# Patient Record
Sex: FEMALE | Race: WHITE | NOT HISPANIC OR LATINO | Employment: UNEMPLOYED
[De-identification: names, ages, dates, MRNs, and addresses within clinical notes are randomized per-mention and may not be internally consistent; named-entity substitution may affect disease eponyms.]

---

## 2016-01-20 LAB — PAP SMEAR - HIM PATIENT REPORTED: NEGATIVE

## 2017-06-17 ENCOUNTER — HEALTH MAINTENANCE LETTER (OUTPATIENT)
Age: 42
End: 2017-06-17

## 2018-02-28 ENCOUNTER — OFFICE VISIT (OUTPATIENT)
Dept: FAMILY MEDICINE | Facility: CLINIC | Age: 43
End: 2018-02-28
Payer: COMMERCIAL

## 2018-02-28 VITALS
HEART RATE: 72 BPM | HEIGHT: 72 IN | TEMPERATURE: 99.2 F | WEIGHT: 170 LBS | DIASTOLIC BLOOD PRESSURE: 67 MMHG | OXYGEN SATURATION: 99 % | SYSTOLIC BLOOD PRESSURE: 107 MMHG | BODY MASS INDEX: 23.03 KG/M2

## 2018-02-28 DIAGNOSIS — Z01.818 PREOP GENERAL PHYSICAL EXAM: Primary | ICD-10-CM

## 2018-02-28 DIAGNOSIS — S86.012A RUPTURE OF ACHILLES TENDON, LEFT, INITIAL ENCOUNTER: ICD-10-CM

## 2018-02-28 LAB — HGB BLD-MCNC: 12.3 G/DL (ref 11.7–15.7)

## 2018-02-28 PROCEDURE — 99202 OFFICE O/P NEW SF 15 MIN: CPT | Performed by: NURSE PRACTITIONER

## 2018-02-28 PROCEDURE — 36415 COLL VENOUS BLD VENIPUNCTURE: CPT | Performed by: NURSE PRACTITIONER

## 2018-02-28 PROCEDURE — 85018 HEMOGLOBIN: CPT | Performed by: NURSE PRACTITIONER

## 2018-02-28 RX ORDER — DULOXETIN HYDROCHLORIDE 60 MG/1
60 CAPSULE, DELAYED RELEASE ORAL
Status: ON HOLD | COMMUNITY
Start: 2016-01-20 | End: 2019-06-17

## 2018-02-28 NOTE — NURSING NOTE
No chief complaint on file.      Initial /67 (BP Location: Right arm, Cuff Size: Adult Large)  Pulse 72  Temp 99.2  F (37.3  C) (Tympanic)  Ht 6' (1.829 m)  Wt 170 lb (77.1 kg)  LMP 05/15/2012  SpO2 99%  Breastfeeding? No  BMI 23.06 kg/m2 Estimated body mass index is 23.06 kg/(m^2) as calculated from the following:    Height as of this encounter: 6' (1.829 m).    Weight as of this encounter: 170 lb (77.1 kg).  Medication Reconciliation: complete   Jazz Ojeda MA

## 2018-02-28 NOTE — MR AVS SNAPSHOT
After Visit Summary   2/28/2018    Daniella Harris    MRN: [id number]           Patient Information     Date Of Birth          1975        Visit Information        Provider Department      2/28/2018 4:00 PM Christiano Guthrie APRN CNP [facility]        Today's Diagnoses     Preop general physical exam    -  1    Rupture of Achilles tendon, left, initial encounter          Care Instructions      Before Your Surgery      Call your surgeon if there is any change in your health. This includes signs of a cold or flu (such as a sore throat, runny nose, cough, rash or fever).    Do not smoke, drink alcohol or take over the counter medicine (unless your surgeon or primary care doctor tells you to) for the 24 hours before and after surgery.    If you take prescribed drugs: Follow your doctor s orders about which medicines to take and which to stop until after surgery.    Eating and drinking prior to surgery: follow the instructions from your surgeon    Take a shower or bath the night before surgery. Use the soap your surgeon gave you to gently clean your skin. If you do not have soap from your surgeon, use your regular soap. Do not shave or scrub the surgery site.  Wear clean pajamas and have clean sheets on your bed.           Follow-ups after your visit        Who to contact     If you have questions or need follow up information about today's clinic visit or your schedule please contact [facility] directly at [phone].  Normal or non-critical lab and imaging results will be communicated to you by MyChart, letter or phone within 4 business days after the clinic has received the results. If you do not hear from us within 7 days, please contact the clinic through MyChart or phone. If you have a critical or abnormal lab result, we will notify you by phone as soon as possible.  Submit refill requests through [employer] or call your pharmacy and they will forward the refill  "request to us. Please allow 3 business days for your refill to be completed.          Additional Information About Your Visit        MyChart Information     [employer] lets you send messages to your doctor, view your test results, renew your prescriptions, schedule appointments and more. To sign up, go to [email or web address] . Click on \"Log in\" on the left side of the screen, which will take you to the Welcome page. Then click on \"Sign up Now\" on the right side of the page.     You will be asked to enter the access code listed below, as well as some personal information. Please follow the directions to create your username and password.     Your access code is: [email or web address]  Expires: 2018 10:41 AM     Your access code will  in 90 days. If you need help or a new code, please call your [facility] clinic or [phone].        Care EveryWhere ID     This is your Care EveryWhere ID. This could be used by other organizations to access your [facility] medical records  [id number]        Your Vitals Were     Pulse Temperature Height Last Period Pulse Oximetry Breastfeeding?    72 99.2  F (37.3  C) (Tympanic) 6' (1.829 m) 05/15/2012 99% No    BMI (Body Mass Index)                   23.06 kg/m2            Blood Pressure from Last 3 Encounters:   18 107/67   10/03/13 104/59   12 101/64    Weight from Last 3 Encounters:   18 170 lb (77.1 kg)   10/03/13 156 lb (70.8 kg)   12 163 lb 3.2 oz (74 kg)              We Performed the Following     Hemoglobin        Primary Care Provider Office Phone # Fax #    Janusz Aranda [phone] [address]   [facility] [address]        Equal Access to Services     BENJA BLAIR : Rangel Pittman, waaxda luqadaha, qaybta kaalmada esme, dakotah granger. [facility] [phone].    ATENCIÓN: Si habla español, tiene a hoang disposición servicios gratuitos de asistencia lingüística. Llame al " [phone].    We comply with applicable federal civil rights laws and [place] laws. We do not discriminate on the basis of race, color, national origin, age, disability, sex, sexual orientation, or gender identity.            Thank you!     Thank you for choosing [facility]  for your care. Our goal is always to provide you with excellent care. Hearing back from our patients is one way we can continue to improve our services. Please take a few minutes to complete the written survey that you may receive in the mail after your visit with us. Thank you!             Your Updated Medication List - Protect others around you: Learn how to safely use, store and throw away your medicines at www.disposemymeds.org.          This list is accurate as of 2/28/18 11:59 PM.  Always use your most recent med list.                   Brand Name Dispense Instructions for use Diagnosis    DULoxetine 60 MG EC capsule    CYMBALTA     Take 60 mg by mouth    Preop general physical exam       MULTIPLE VITAMINS PO      Take  by mouth.        traZODone 50 MG tablet    DESYREL     Take 50 mg by mouth At Bedtime.        TURMERIC PO      Turmeric Supreme    Preop general physical exam       TYSABRI 300 MG/15ML injection   Generic drug:  natalizumab      Inject  into the vein. Once monthly        VITAMIN D PO      Take  by mouth.

## 2018-02-28 NOTE — LETTER
[place] [address] [facility]  [address]  [place]  [address]  Tel: [phone]    March 1, 2018    Daniella Harris  [address] [facility] [address]        Dear Ms. Harris,    Beck Brewer  Your preop hemoglobin looks great! Best of luck on your surgery!  SUSSY Chávez CNP    If you have any further questions or problems, please contact our office.      Sincerely,    Christiano Guthrie NP/ Marychuy Huffman CMA  Results for orders placed or performed in visit on 02/28/18   Hemoglobin   Result Value Ref Range    Hemoglobin 12.3 11.7 - 15.7 g/dL               Enclosure: Lab Results

## 2018-02-28 NOTE — PROGRESS NOTES
[address] [address]  [phone]  Dept: [phone]    PRE-OP EVALUATION:  Today's date: 2018    Daniella Harris (: 1975) presents for pre-operative evaluation assessment as requested by Иван Lao MD  .  She requires evaluation and anesthesia risk assessment prior to undergoing surgery/procedure for treatment of LEFT achilles rupture.    Proposed Surgery/ Procedure: LEFT open achilles tendon repair  Date of Surgery/ Procedure: 3/6/2018  Time of Surgery/ Procedure: 1:30  Hospital/Surgical Facility: [facility]  Fax number for surgical facility: **  Primary Physician: Janusz Aranda  Type of Anesthesia Anticipated: General    Patient has a Health Care Directive or Living Will:  YES at home - will bring to surgery center and to clinic for chart.    1. NO - Do you have a history of heart attack, stroke, stent, bypass or surgery on an artery in the head, neck, heart or legs?  2. NO - Do you ever have any pain or discomfort in your chest?  3. NO - Do you have a history of  Heart Failure?  4. NO - Are you troubled by shortness of breath when: walking on the level, up a slight hill or at night?  5. NO - Do you currently have a cold, bronchitis or other respiratory infection?  6. NO - Do you have a cough, shortness of breath or wheezing?  7. NO - Do you sometimes get pains in the calves of your legs when you walk?  8. NO - Do you or anyone in your family have previous history of blood clots?  9. NO - Do you or does anyone in your family have a serious bleeding problem such as prolonged bleeding following surgeries or cuts?  10. NO - Have you ever had problems with anemia or been told to take iron pills?  11. NO - Have you had any abnormal blood loss such as black, tarry or bloody stools, or abnormal vaginal bleeding?  12. NO - Have you ever had a blood transfusion?  13. NO - Have you or any of your relatives ever had problems with  anesthesia?  14. NO - Do you have sleep apnea, excessive snoring or daytime drowsiness?  15. NO - Do you have any prosthetic heart valves?  16. NO - Do you have prosthetic joints?  17. NO - Is there any chance that you may be pregnant?      HPI:     HPI related to upcoming procedure: achilles rupture on 2/23       See problem list for active medical problems.  Problems all longstanding and stable, except as noted/documented.  See ROS for pertinent symptoms related to these conditions.                                                                                                  .    MEDICAL HISTORY:     Patient Active Problem List    Diagnosis Date Noted     Advanced directives, counseling/discussion 05/31/2012     Priority: Medium     Depression, major, in remission (H)      Priority: Medium     CARDIOVASCULAR SCREENING; LDL GOAL LESS THAN 160 04/16/2012     Priority: Medium     Multiple scleroses      Priority: Medium     Problem list name updated by automated process. Provider to review and confirm        Past Medical History:   Diagnosis Date     Depression, major, in remission (H)      Multiple scleroses 2005    Dr. Mejia     Past Surgical History:   Procedure Laterality Date     wisdom teeth removed  18     Current Outpatient Prescriptions   Medication Sig Dispense Refill     DULoxetine (CYMBALTA) 30 MG capsule Take 30 mg by mouth daily       natalizumab (TYSABRI) 300 MG/15ML injection Inject  into the vein. Once monthly       traZODone (DESYREL) 50 MG tablet Take 50 mg by mouth At Bedtime.       MULTIPLE VITAMINS PO Take  by mouth.       FISH OIL        Cholecalciferol (VITAMIN D PO) Take  by mouth.       OTC products: None, except as noted above    No Known Allergies   Latex Allergy: NO    Social History   Substance Use Topics     Smoking status: Never Smoker     Smokeless tobacco: Never Used     Alcohol use Yes      Comment: min     History   Drug Use No       REVIEW OF SYSTEMS:   Constitutional,  HEENT, cardiovascular, pulmonary, gi and gu systems are negative, except as otherwise noted.    EXAM:   /67 (BP Location: Right arm, Cuff Size: Adult Large)  Pulse 72  Temp 99.2  F (37.3  C) (Tympanic)  Ht 6' (1.829 m)  Wt 170 lb (77.1 kg)  LMP 05/15/2012  SpO2 99%  Breastfeeding? No  BMI 23.06 kg/m2    GENERAL APPEARANCE: healthy, alert and no distress     EYES: EOMI, PERRL     HENT: ear canals and TM's normal and nose and mouth without ulcers or lesions     NECK: no adenopathy, no asymmetry, masses, or scars and thyroid normal to palpation     RESP: lungs clear to auscultation - no rales, rhonchi or wheezes     CV: regular rates and rhythm, normal S1 S2, no S3 or S4 and no murmur, click or rub     MS: extremities normal- no gross deformities noted, no evidence of inflammation in joints, FROM in all extremities.     SKIN: no suspicious lesions or rashes     NEURO: Normal strength and tone, sensory exam grossly normal, mentation intact and speech normal     PSYCH: mentation appears normal. and affect normal/bright     LYMPHATICS: No cervical adenopathy    DIAGNOSTICS:       Labs Resulted Today:   Results for orders placed or performed in visit on 02/28/18   Hemoglobin   Result Value Ref Range    Hemoglobin 12.3 11.7 - 15.7 g/dL       Recent Labs   Lab Test  05/31/12   0928   HGB  10.9*   PLT  348        IMPRESSION:   Reason for surgery/procedure: ruptured achilles tendon  Diagnosis/reason for consult: medical preop    The proposed surgical procedure is considered INTERMEDIATE risk.    REVISED CARDIAC RISK INDEX  The patient has the following serious cardiovascular risks for perioperative complications such as (MI, PE, VFib and 3  AV Block):  No serious cardiac risks  INTERPRETATION: 0 risks: Class I (very low risk - 0.4% complication rate)    The patient has the following additional risks for perioperative complications:  No identified additional risks      ICD-10-CM    1. Preop general physical exam  Z01.818 DULoxetine (CYMBALTA) 60 MG EC capsule     TURMERIC PO     Hemoglobin   2. Rupture of Achilles tendon, left, initial encounter S86.012A        RECOMMENDATIONS:       --Patient is to take all scheduled medications on the day of surgery EXCEPT for modifications listed below.    APPROVAL GIVEN to proceed with proposed procedure, without further diagnostic evaluation       Signed Electronically by: SUSSY Naranjo CNP    Copy of this evaluation report is provided to requesting physician.    [facility] Preop Guidelines

## 2018-03-05 ENCOUNTER — TELEPHONE (OUTPATIENT)
Dept: FAMILY MEDICINE | Facility: CLINIC | Age: 43
End: 2018-03-05

## 2018-03-05 NOTE — TELEPHONE ENCOUNTER
Reason for Call:  Form, our goal is to have forms completed with 72 hours, however, some forms may require a visit or additional information.    Type of letter, form or note:  PRE OP form    Who is the form from?: Patient    Where did the form come from: Patient or family brought in       What clinic location was the form placed at?: [facility]    Where the form was placed: Given to MA/RN    What number is listed as a contact on the form?:   [facility]:  [phone]    Please fax to:  [phone]       Additional comments: please fax pre op form asap    Call taken on 3/5/2018 at 1:34 PM by Lucinda Oliver    .

## 2018-03-05 NOTE — TELEPHONE ENCOUNTER
I faxed Pre op notes and lab to [facility] [place]. Marychuy Huffman, CMA

## 2018-03-26 ENCOUNTER — TELEPHONE (OUTPATIENT)
Dept: FAMILY MEDICINE | Facility: CLINIC | Age: 43
End: 2018-03-26

## 2018-03-26 NOTE — TELEPHONE ENCOUNTER
3/26/2018    Call Regarding [facility] Mammogram    Attempt 1    Message on voicemail     Comments:     Other screenings that are due:  Pap    Outreach   SV

## 2018-06-19 ENCOUNTER — OFFICE VISIT (OUTPATIENT)
Dept: FAMILY MEDICINE | Facility: CLINIC | Age: 43
End: 2018-06-19
Payer: COMMERCIAL

## 2018-06-19 VITALS
SYSTOLIC BLOOD PRESSURE: 106 MMHG | HEART RATE: 66 BPM | WEIGHT: 183 LBS | OXYGEN SATURATION: 100 % | RESPIRATION RATE: 14 BRPM | HEIGHT: 72 IN | BODY MASS INDEX: 24.79 KG/M2 | TEMPERATURE: 98 F | DIASTOLIC BLOOD PRESSURE: 68 MMHG

## 2018-06-19 DIAGNOSIS — Z71.84 TRAVEL ADVICE ENCOUNTER: Primary | ICD-10-CM

## 2018-06-19 PROCEDURE — 99402 PREV MED CNSL INDIV APPRX 30: CPT | Mod: 25 | Performed by: FAMILY MEDICINE

## 2018-06-19 PROCEDURE — 90472 IMMUNIZATION ADMIN EACH ADD: CPT | Performed by: FAMILY MEDICINE

## 2018-06-19 PROCEDURE — 90691 TYPHOID VACCINE IM: CPT | Performed by: FAMILY MEDICINE

## 2018-06-19 PROCEDURE — 90471 IMMUNIZATION ADMIN: CPT | Performed by: FAMILY MEDICINE

## 2018-06-19 PROCEDURE — 90632 HEPA VACCINE ADULT IM: CPT | Performed by: FAMILY MEDICINE

## 2018-06-19 RX ORDER — CIPROFLOXACIN 500 MG/1
500 TABLET, FILM COATED ORAL 2 TIMES DAILY
Qty: 6 TABLET | Refills: 0 | Status: SHIPPED | OUTPATIENT
Start: 2018-06-19 | End: 2019-05-01

## 2018-06-19 RX ORDER — ATOVAQUONE AND PROGUANIL HYDROCHLORIDE 250; 100 MG/1; MG/1
1 TABLET, FILM COATED ORAL DAILY
Qty: 26 TABLET | Refills: 0 | Status: SHIPPED | OUTPATIENT
Start: 2018-06-19 | End: 2019-05-01

## 2018-06-19 NOTE — PROGRESS NOTES
SUBJECTIVE: Daniella Harris , a 42 year old  female, presents for counseling and information regarding upcoming travel to [facility] and [facility]. Special medical concerns   include: hx of multiple sclerosis. She anticipates the following unusual   exposures: none     Itinerary: (List all countries)  [place] and [place]  Departure Date: 06/24/2018, Return Date: 7/10/18  Reason for Travel (i.e. business, pleasure): Pleasure   Visiting an urban or rural area? both  Accommodations (i.e. hotel, hostel, friends, family etc.): Hotels   Women - First day of your last period: N/A    IMMUNIZATION HISTORY  Have you received any vaccinations in the past 4 weeks?  No   Have you ever fainted from having your blood drawn or from an injection?  No  Have you ever had a fever reaction to a vaccination?  No  Have you had any bad reaction / side effect from any vaccination?  No  Have you ever had hepatitis A or B vaccine?  No  Do you live (or work closely with anyone who has AIDS, or any other immune disorder, or who is on chemotherapy for cancer or family history of immunodeficiency?  No  Have you received any injection of immune globulin or any blood products during the past 12 months?  No    GENERAL MEDICAL HISTORY  Do you have a medical condition that warrants maintenance meds or physician follow-up?  Yes   Do you have a medical condition that is stable now, but that may recur while traveling?  No  Has your spleen been removed?   No  Have you had an acute illness or a fever in the past 48 hours?  No  Are you pregnant or might you become pregnant on this trip? Any chance of pregnancy?  No  Are you breastfeeding?  No  Do you have HIV, AIDS, an AIDS-like condition, any other immune disorder, leukemia or cancer?  No  Do you have a severe combined immunodeficiency disease?  No  Have you had your thymus gland removed or a history of problems with your thymus, such as myasthenia gravis, DiGeorge syndrome, or thymoma?  No  Do you have  severe thrombocytopenia (low platelet count) or blood clotting disorder?  No  Have you ever had a convulsion, seizure, epilepsy, neurologic condition or brain infection?  No  Do you have any stomach conditions?  No  Do you have a G6PD deficiency?  No  Do you have severe renal or kidney impairment?  No  Do you have a history of psychiatric problems?  No  Do you have a problem with strange dreams and/or nightmares?  No  Do you have insomnia?  No  Do you have problems with vaginitis?  No  Do you have psoriasis?  No  Are you prone to motion sickness?  No  Have you ever had headaches, nausea, vomiting or breathing problems from altitude exposure?  No    MEDICATIONS  ARE YOU TAKING:  Steroids, prednisone, or anti-cancer drugs?  No  Antibiotics or sulfonamides?  No  Oral contraceptives?  No  Aspirin therapy? (children & adolescents)  No    ALLERGIES  ARE YOU ALLERGIC TO:  Any medications?  No  Any foods or other?  No     Patient Active Problem List   Diagnosis Code     CARDIOVASCULAR SCREENING; LDL GOAL LESS THAN 160 Z13.6     Multiple scleroses G35     Depression, major, in remission (H) F32.5     Advanced directives, counseling/discussion Z71.89         Past Medical History:   Diagnosis Date     Depression, major, in remission (H)      Multiple scleroses 2005    Dr. Mejia      Immunization History   Administered Date(s) Administered     HepA-Adult 06/19/2018     Influenza (IIV3) PF 10/03/2013     Influenza Vaccine IM 3yrs+ 4 Valent IIV4 11/08/2017     MMR 07/24/2001     Pneumo Conj 13-V (2010&after) 10/03/2013     Pneumococcal 23 valent 12/13/2013     Tdap (Adacel,Boostrix) 10/01/2008     Tdap (Adult) Unspecified Formulation 11/08/2017     Typhoid IM 06/19/2018       Current Outpatient Prescriptions   Medication Sig Dispense Refill     atovaquone-proguanil (MALARONE) 250-100 MG per tablet Take 1 tablet by mouth daily Start 2 days before travel and continue 7 days after return. 26 tablet 0     Cholecalciferol (VITAMIN  D PO) Take  by mouth.       ciprofloxacin (CIPRO) 500 MG tablet Take 1 tablet (500 mg) by mouth 2 times daily X 3 days for traveler's diarrhea 6 tablet 0     DULoxetine (CYMBALTA) 60 MG EC capsule Take 60 mg by mouth       MULTIPLE VITAMINS PO Take  by mouth.       natalizumab (TYSABRI) 300 MG/15ML injection Inject  into the vein. Once monthly       traZODone (DESYREL) 50 MG tablet Take 50 mg by mouth At Bedtime.       TURMERIC PO Turmeric Supreme       No Known Allergies     EXAM: deferred    Immunizations discussed include: Hepatitis A and Typhoid  Malaraia prophylaxis recommended: Malarone  Symptomatic treatment for traveler's diarrhea: ciprofloxacin    ASSESSMENT/PLAN:  (Z71.89) Travel advice encounter  (primary encounter diagnosis)  Plan: atovaquone-proguanil (MALARONE) 250-100 MG per         tablet, ciprofloxacin (CIPRO) 500 MG tablet,         TYPHOID VACCINE, IM, HEPA VACCINE ADULT IM    I have reviewed general recommendations for safe travel including: food/water precautions, insect avoidance, , roadway safety.     Educational materials and [employer] report provided.  Post Travel Period sheet discussed.  Sherie Goel MD   [facility]

## 2018-06-19 NOTE — PATIENT INSTRUCTIONS
See travel packet provided  Recommend ultrathon (mosquito repellant), pepto bismol and imodium  Also bring hand  and sun screen with you.  Safe Travels

## 2018-06-19 NOTE — MR AVS SNAPSHOT
After Visit Summary   6/19/2018    Daniella Harris    MRN: [id number]           Patient Information     Date Of Birth          1975        Visit Information        Provider Department      6/19/2018 2:30 PM Sherie Samuel MD [facility]        Today's Diagnoses     Travel advice encounter    -  1      Care Instructions    See travel packet provided  Recommend ultrathon (mosquito repellant), pepto bismol and imodium  Also bring hand  and sun screen with you.  Safe Travels                   Follow-ups after your visit        Who to contact     If you have questions or need follow up information about today's clinic visit or your schedule please contact [facility] directly at [phone].  Normal or non-critical lab and imaging results will be communicated to you by MyChart, letter or phone within 4 business days after the clinic has received the results. If you do not hear from us within 7 days, please contact the clinic through MyChart or phone. If you have a critical or abnormal lab result, we will notify you by phone as soon as possible.  Submit refill requests through GetJar or call your pharmacy and they will forward the refill request to us. Please allow 3 business days for your refill to be completed.          Additional Information About Your Visit        Care EveryWhere ID     This is your Care EveryWhere ID. This could be used by other organizations to access your [place] medical records  [id number]        Your Vitals Were     Pulse Temperature Respirations Height Last Period Pulse Oximetry    66 98  F (36.7  C) (Oral) 14 6' (1.829 m) 05/15/2012 100%    BMI (Body Mass Index)                   24.82 kg/m2            Blood Pressure from Last 3 Encounters:   06/19/18 106/68   02/28/18 107/67   10/03/13 104/59    Weight from Last 3 Encounters:   06/19/18 183 lb (83 kg)   02/28/18 170 lb (77.1 kg)   10/03/13 156 lb (70.8 kg)               Today, you had the following     No orders found for display         Today's Medication Changes          These changes are accurate as of 6/19/18  3:15 PM.  If you have any questions, ask your nurse or doctor.               Start taking these medicines.        Dose/Directions    atovaquone-proguanil 250-100 MG per tablet   Commonly known as:  MALARONE   Used for:  Travel advice encounter   Started by:  Sherie Samuel MD        Dose:  1 tablet   Take 1 tablet by mouth daily Start 2 days before travel and continue 7 days after return.   Quantity:  26 tablet   Refills:  0       ciprofloxacin 500 MG tablet   Commonly known as:  CIPRO   Used for:  Travel advice encounter   Started by:  Sherie Samuel MD        Dose:  500 mg   Take 1 tablet (500 mg) by mouth 2 times daily X 3 days for traveler's diarrhea   Quantity:  6 tablet   Refills:  0            Where to get your medicines      These medications were sent to [place] [address] [place] [address] [address]     Phone:  [phone]     atovaquone-proguanil 250-100 MG per tablet    ciprofloxacin 500 MG tablet                Primary Care Provider Office Phone # Fax #    Janusz Aranda [phone] [address] [address]        Equal Access to Services     [facility] AH: Hadii aad ku hadasho Soomaali, waaxda luqadaha, qaybta kaalmada adeegyada, dakotah martinez . So [facility] [phone].    ATENCIÓN: Si habla español, tiene a hoang disposición servicios gratuitos de asistencia lingüística. Llame al [phone].    We comply with applicable federal civil rights laws and [place] laws. We do not discriminate on the basis of race, color, national origin, age, disability, sex, sexual orientation, or gender identity.            Thank you!     Thank you for choosing [facility]  for your care. Our goal is  always to provide you with excellent care. Hearing back from our patients is one way we can continue to improve our services. Please take a few minutes to complete the written survey that you may receive in the mail after your visit with us. Thank you!             Your Updated Medication List - Protect others around you: Learn how to safely use, store and throw away your medicines at www.disposemymeds.org.          This list is accurate as of 6/19/18  3:15 PM.  Always use your most recent med list.                   Brand Name Dispense Instructions for use Diagnosis    atovaquone-proguanil 250-100 MG per tablet    MALARONE    26 tablet    Take 1 tablet by mouth daily Start 2 days before travel and continue 7 days after return.    Travel advice encounter       ciprofloxacin 500 MG tablet    CIPRO    6 tablet    Take 1 tablet (500 mg) by mouth 2 times daily X 3 days for traveler's diarrhea    Travel advice encounter       DULoxetine 60 MG EC capsule    CYMBALTA     Take 60 mg by mouth    Preop general physical exam       MULTIPLE VITAMINS PO      Take  by mouth.        traZODone 50 MG tablet    DESYREL     Take 50 mg by mouth At Bedtime.        TURMERIC PO      Turmeric Supreme    Preop general physical exam       TYSABRI 300 MG/15ML injection   Generic drug:  natalizumab      Inject  into the vein. Once monthly        VITAMIN D PO      Take  by mouth.

## 2018-08-01 NOTE — TELEPHONE ENCOUNTER
8/1/2018    Attempt 2    Contacted patient in regards to scheduling VIP mammogram  Message on voicemail     Patient is also due for - Preventive Health Screening Cervical/PAP    Comments:       Outreach   Ness Lowe

## 2019-01-20 LAB — HPV ABSTRACT: NORMAL

## 2019-01-25 ENCOUNTER — TELEPHONE (OUTPATIENT)
Dept: FAMILY MEDICINE | Facility: CLINIC | Age: 44
End: 2019-01-25

## 2019-01-25 NOTE — TELEPHONE ENCOUNTER
Panel Management Review      Patient has the following on her problem list: None      Composite cancer screening  Chart review shows that this patient is due/due soon for the following Pap Smear  Summary:    Patient is due/failing the following:   PAP    Action needed:   None at this time, patient had normal pap through [facility] per care everywhere    Type of outreach:    none at this time, updated last pap    Questions for provider review:      Isabel Pritchett/CMA  [place]---[facility]                                                                                                                                      Isabel Pritchett/CMA  [place]---[facility]       Chart routed to none .

## 2019-05-01 ENCOUNTER — OFFICE VISIT (OUTPATIENT)
Dept: SURGERY | Facility: CLINIC | Age: 44
End: 2019-05-01
Payer: COMMERCIAL

## 2019-05-01 VITALS
WEIGHT: 170 LBS | DIASTOLIC BLOOD PRESSURE: 64 MMHG | BODY MASS INDEX: 23.03 KG/M2 | HEART RATE: 62 BPM | HEIGHT: 72 IN | SYSTOLIC BLOOD PRESSURE: 102 MMHG

## 2019-05-01 DIAGNOSIS — K42.9 UMBILICAL HERNIA WITHOUT OBSTRUCTION AND WITHOUT GANGRENE: Primary | ICD-10-CM

## 2019-05-01 PROCEDURE — 99244 OFF/OP CNSLTJ NEW/EST MOD 40: CPT | Performed by: SURGERY

## 2019-05-01 RX ORDER — DULOXETIN HYDROCHLORIDE 30 MG/1
30 CAPSULE, DELAYED RELEASE ORAL 2 TIMES DAILY
Status: ON HOLD | COMMUNITY
End: 2019-06-17

## 2019-05-01 ASSESSMENT — MIFFLIN-ST. JEOR: SCORE: 1528.11

## 2019-05-01 NOTE — PROGRESS NOTES
[facility]  Surgery Consultation    CONSULTATION REQUESTED BY:  Janusz Aranda [phone]    HPI: Patient is a 43-year-old female referred by her primary care provider for consultation regarding umbilical hernia.  She states that in the recent past she has noted a protuberance within the central portion of her abdomen.  This causes minimal discomfort.  She has no signs or symptoms of incarceration or strangulation.  No bowel obstruction symptoms.    PMH:   has a past medical history of Depression, major, in remission (H) and Multiple scleroses (2005).  PSH:    has a past surgical history that includes wisdom teeth removed (18).  Social History:   reports that she has never smoked. She has never used smokeless tobacco. She reports that she drinks alcohol. She reports that she does not use drugs.  Family History:  family history is not on file.  Medications/Allergies: Home medications and allergies reviewed.    ROS:  The 10 point Review of Systems is negative other than noted in the HPI.    Physical Exam:  /64   Pulse 62   Ht 1.829 m (6')   Wt 77.1 kg (170 lb)   LMP 05/15/2012   BMI 23.06 kg/m    GENERAL: Generally appears well.  Psych: Alert and Oriented.  Normal affect  Eyes: Sclera clear  Respiratory:  Lungs clear to ausculation bilaterally with good air excursion  Cardiovascular:  Regular Rate and Rhythm with no murmurs gallops or rubs, normal peripheral pulses  GI: Abdomen Non Distended Non-Tender  Umbilical hernia palpated.  Hernia easily reduciable..  Lymphatic/Hematologic/Immune:  No femoral or cervical lymphadenopathy.  Integumentary:  No rashes  Neurological: grossly intact     All new lab and imaging data was reviewed.     Impression and Plan:  Patient is a 43 year old female with umbilical hernia    PLAN: Recommend outpatient open repair at her convenience  I discussed the pathophysiology of hernias and options for repair including laparoscopic VS open.  The risks  associated with the procedure including, but not limited to, recurrence, nerve entrapment or injury, persistence of pain, injury to the bowel/bladder, infertility, hematoma, mesh migration, mesh infection, MI, and PE were discussed with the patient. She indicated understanding of the discussion, asked appropriate questions, and provided consent. Signs and symptoms of incarceration were discussed. If these develop in the interim, she promises to call or go straight to the ER. I have provided the patient with an information pamphlet.    Thank you very much for this consult.    Marco A Martel M.D.  [facility]  [phone]    Please route or send letter to:  Primary Care Provider (PCP) and Referring Provider

## 2019-05-02 ENCOUNTER — TELEPHONE (OUTPATIENT)
Dept: SURGERY | Facility: CLINIC | Age: 44
End: 2019-05-02

## 2019-05-02 NOTE — TELEPHONE ENCOUNTER
Type of surgery: Umbilical hernia repair with mesh  Location of surgery: [facility]  Date and time of surgery: 6/17/19 at 7:30am  Surgeon: Dr. Marco A Martel  Pre-Op Appt Date: Patient to schedule  Post-Op Appt Date: Patient to schedule   Packet sent out: Yes  Pre-cert/Authorization completed:  Not Applicable  Date: 5/1/19

## 2019-06-03 ENCOUNTER — OFFICE VISIT (OUTPATIENT)
Dept: FAMILY MEDICINE | Facility: CLINIC | Age: 44
End: 2019-06-03
Payer: COMMERCIAL

## 2019-06-03 VITALS
BODY MASS INDEX: 23.7 KG/M2 | HEART RATE: 58 BPM | TEMPERATURE: 97.3 F | DIASTOLIC BLOOD PRESSURE: 69 MMHG | HEIGHT: 72 IN | WEIGHT: 175 LBS | SYSTOLIC BLOOD PRESSURE: 107 MMHG | OXYGEN SATURATION: 100 %

## 2019-06-03 DIAGNOSIS — F32.5 DEPRESSION, MAJOR, IN REMISSION (H): ICD-10-CM

## 2019-06-03 DIAGNOSIS — Z01.818 PREOP GENERAL PHYSICAL EXAM: Primary | ICD-10-CM

## 2019-06-03 DIAGNOSIS — G35 MULTIPLE SCLEROSIS (H): ICD-10-CM

## 2019-06-03 DIAGNOSIS — K42.9 UMBILICAL HERNIA WITHOUT OBSTRUCTION AND WITHOUT GANGRENE: ICD-10-CM

## 2019-06-03 PROCEDURE — 99214 OFFICE O/P EST MOD 30 MIN: CPT | Performed by: INTERNAL MEDICINE

## 2019-06-03 ASSESSMENT — PATIENT HEALTH QUESTIONNAIRE - PHQ9: SUM OF ALL RESPONSES TO PHQ QUESTIONS 1-9: 6

## 2019-06-03 ASSESSMENT — MIFFLIN-ST. JEOR: SCORE: 1550.79

## 2019-06-03 NOTE — PROGRESS NOTES
[facility]  [address] [address]  [phone]  Dept: [phone]    PRE-OP EVALUATION:  Today's date: 6/3/2019    Daniella Harris (: 1975) presents for pre-operative evaluation assessment as requested by Dr. Martel.  She requires evaluation and anesthesia risk assessment prior to undergoing surgery/procedure for treatment of Hernia .    Proposed Surgery/ Procedure: OPEN UMBILICAL HERNIA REPAIR WITH MESH  Date of Surgery/ Procedure: 2019  Time of Surgery/ Procedure: 11:45am  Hospital/Surgical Facility:  OR   Fax number for surgical facility:   Primary Physician: Janusz Aranda  Type of Anesthesia Anticipated: Monitor anesthesia care    Patient has a Health Care Directive or Living Will:  NO    The patient is having surgery as noted.  She feels fine and can do over 4 mets without diff.  No recent ms issues, depression is controlled.  She has cold hands and feet at times but no chest pain or shortness of breath.    She is , 2 kids 12 and 17, she works part time as  [employer].  She is up to date with gyn                Past Medical History:      Past Medical History:   Diagnosis Date     Depression, major, in remission (H)      Multiple scleroses     Dr. Mejia             Past Surgical History:      Past Surgical History:   Procedure Laterality Date     achilles tendon surgery Left 2018     LAPAROSCOPIC SINGLE SITE TOTAL HYSTERECTOMY      for bleeding not cancer     wisdom teeth removed  18             Social History:     Social History     Tobacco Use     Smoking status: Never Smoker     Smokeless tobacco: Never Used   Substance Use Topics     Alcohol use: Yes     Comment: 1 a week             Family History:   No family history of bleeding difficulty, anesthesia problems or blood clots.         Allergies:     Allergies   Allergen Reactions     Erythromycin Unknown     Many years ago-unknown reaction  Many years  ago-unknown reaction               Medications:     Current Outpatient Medications   Medication Sig Dispense Refill     Cholecalciferol (VITAMIN D PO) Take 10,000 Units by mouth daily        DULoxetine (CYMBALTA) 30 MG capsule Take 30 mg by mouth 2 times daily       DULoxetine (CYMBALTA) 60 MG EC capsule Take 60 mg by mouth       MULTIPLE VITAMINS PO Take  by mouth.       Ocrelizumab (OCREVUS IV) Inject into the vein every 6 months       traZODone (DESYREL) 50 MG tablet Take 50 mg by mouth At Bedtime.       TURMERIC PO Turmeric Supreme                 Review of Systems:   No history of bleeding difficulty, anesthesia problems or blood clots.  The 10 point Review of Systems is negative other than noted in the HPI           Physical Exam:   Blood pressure 107/69, pulse 58, temperature 97.3  F (36.3  C), temperature source Oral, height 1.829 m (6'), weight 79.4 kg (175 lb), last menstrual period 05/15/2012, SpO2 100 %, not currently breastfeeding.    Constitutional: healthy appearing, alert and in no distress  Heent: Normocephalic. Head without obvious masses or lesions. PERRLDC, EOMI. Mouth exam within normal limits: tongue, mucous membranes, posterior pharynx all normal, no lesions or abnormalities seen.  Tm's and canals within normal limits bilaterally. Neck supple, no nuchal rigidity or masses. No supraclavicular, or cervical adenopathy. Thyroid symmetric, no masses.  Cardiovascular: Regular rate and rhythm, no murmer, rub or gallops.  JVP not elevated, no edema.  Carotids within normal limits bilaterally, no bruits.  Respiratory: Normal respiratory effort.  Lungs clear, normal flow, no wheezing or crackles.  Gastrointestinal: Normal active bowel sounds.   Soft, not tender, no masses, guarding or rebound.  No hepatosplenomegaly.   Musculoskeletal: extremities normal, no gross deformities noted.  Skin: no suspicious lesions or rashes   Neurologic: Mental status within normal limits.  Speech fluent.  No gross motor  abnormalities and gait intact.  Psychiatric: mentation appears normal and affect normal.         Data:   none        Assessment:   1. Normal pre op exam for hernia, this patient should be low risk for the surgery  2. Depression, doing well  3. Ms, no issues         Plan:   The patient is ok for the procedure as planned      Janusz Aranda M.D.

## 2019-06-04 ASSESSMENT — ASTHMA QUESTIONNAIRES: ACT_TOTALSCORE: 20

## 2019-06-17 ENCOUNTER — ANESTHESIA EVENT (OUTPATIENT)
Dept: SURGERY | Facility: CLINIC | Age: 44
End: 2019-06-17
Payer: COMMERCIAL

## 2019-06-17 ENCOUNTER — HOSPITAL ENCOUNTER (OUTPATIENT)
Facility: CLINIC | Age: 44
Discharge: HOME | End: 2019-06-17
Attending: SURGERY | Admitting: SURGERY
Payer: COMMERCIAL

## 2019-06-17 ENCOUNTER — ANESTHESIA (OUTPATIENT)
Dept: SURGERY | Facility: CLINIC | Age: 44
End: 2019-06-17
Payer: COMMERCIAL

## 2019-06-17 ENCOUNTER — APPOINTMENT (OUTPATIENT)
Dept: SURGERY | Facility: PHYSICIAN GROUP | Age: 44
End: 2019-06-17
Payer: COMMERCIAL

## 2019-06-17 VITALS
BODY MASS INDEX: 23.3 KG/M2 | RESPIRATION RATE: 12 BRPM | SYSTOLIC BLOOD PRESSURE: 104 MMHG | HEIGHT: 72 IN | OXYGEN SATURATION: 99 % | WEIGHT: 172 LBS | HEART RATE: 58 BPM | TEMPERATURE: 97.9 F | DIASTOLIC BLOOD PRESSURE: 70 MMHG

## 2019-06-17 DIAGNOSIS — G89.18 ACUTE POST-OPERATIVE PAIN: ICD-10-CM

## 2019-06-17 DIAGNOSIS — K42.9 UMBILICAL HERNIA WITHOUT OBSTRUCTION AND WITHOUT GANGRENE: Primary | ICD-10-CM

## 2019-06-17 PROCEDURE — 25000566 ZZH SEVOFLURANE, EA 15 MIN: Performed by: SURGERY

## 2019-06-17 PROCEDURE — 37000008 ZZH ANESTHESIA TECHNICAL FEE, 1ST 30 MIN: Performed by: SURGERY

## 2019-06-17 PROCEDURE — 71000013 ZZH RECOVERY PHASE 1 LEVEL 1 EA ADDTL HR: Performed by: SURGERY

## 2019-06-17 PROCEDURE — 25000128 H RX IP 250 OP 636: Performed by: SURGERY

## 2019-06-17 PROCEDURE — 27210794 ZZH OR GENERAL SUPPLY STERILE: Performed by: SURGERY

## 2019-06-17 PROCEDURE — 25000125 ZZHC RX 250: Performed by: SURGERY

## 2019-06-17 PROCEDURE — 37000009 ZZH ANESTHESIA TECHNICAL FEE, EACH ADDTL 15 MIN: Performed by: SURGERY

## 2019-06-17 PROCEDURE — 25800030 ZZH RX IP 258 OP 636: Performed by: NURSE ANESTHETIST, CERTIFIED REGISTERED

## 2019-06-17 PROCEDURE — 25000132 ZZH RX MED GY IP 250 OP 250 PS 637: Performed by: PHYSICIAN ASSISTANT

## 2019-06-17 PROCEDURE — 40000170 ZZH STATISTIC PRE-PROCEDURE ASSESSMENT II: Performed by: SURGERY

## 2019-06-17 PROCEDURE — 36000052 ZZH SURGERY LEVEL 2 EA 15 ADDTL MIN: Performed by: SURGERY

## 2019-06-17 PROCEDURE — 71000012 ZZH RECOVERY PHASE 1 LEVEL 1 FIRST HR: Performed by: SURGERY

## 2019-06-17 PROCEDURE — 25000125 ZZHC RX 250: Performed by: NURSE ANESTHETIST, CERTIFIED REGISTERED

## 2019-06-17 PROCEDURE — 71000027 ZZH RECOVERY PHASE 2 EACH 15 MINS: Performed by: SURGERY

## 2019-06-17 PROCEDURE — 36000050 ZZH SURGERY LEVEL 2 1ST 30 MIN: Performed by: SURGERY

## 2019-06-17 PROCEDURE — 49585 ZZHC REPAIR UMBILICAL HERN,5+Y/O,REDUC: CPT | Performed by: SURGERY

## 2019-06-17 PROCEDURE — 25000128 H RX IP 250 OP 636: Performed by: NURSE ANESTHETIST, CERTIFIED REGISTERED

## 2019-06-17 PROCEDURE — 49585 ZZHC REPAIR UMBILICAL HERN,5+Y/O,REDUC: CPT | Mod: AS | Performed by: PHYSICIAN ASSISTANT

## 2019-06-17 PROCEDURE — C1781 MESH (IMPLANTABLE): HCPCS | Performed by: SURGERY

## 2019-06-17 DEVICE — MESH COMPOSITE W/COLLAGEN 4CM OPEN VENTRAL PARIETEX PCO4VP: Type: IMPLANTABLE DEVICE | Site: UMBILICAL | Status: FUNCTIONAL

## 2019-06-17 RX ORDER — ONDANSETRON 4 MG/1
4 TABLET, ORALLY DISINTEGRATING ORAL EVERY 30 MIN PRN
Status: DISCONTINUED | OUTPATIENT
Start: 2019-06-17 | End: 2019-06-17 | Stop reason: HOSPADM

## 2019-06-17 RX ORDER — HYDROMORPHONE HYDROCHLORIDE 1 MG/ML
.3-.5 INJECTION, SOLUTION INTRAMUSCULAR; INTRAVENOUS; SUBCUTANEOUS EVERY 10 MIN PRN
Status: DISCONTINUED | OUTPATIENT
Start: 2019-06-17 | End: 2019-06-17 | Stop reason: HOSPADM

## 2019-06-17 RX ORDER — KETOROLAC TROMETHAMINE 30 MG/ML
INJECTION, SOLUTION INTRAMUSCULAR; INTRAVENOUS PRN
Status: DISCONTINUED | OUTPATIENT
Start: 2019-06-17 | End: 2019-06-17

## 2019-06-17 RX ORDER — SODIUM CHLORIDE, SODIUM LACTATE, POTASSIUM CHLORIDE, CALCIUM CHLORIDE 600; 310; 30; 20 MG/100ML; MG/100ML; MG/100ML; MG/100ML
INJECTION, SOLUTION INTRAVENOUS CONTINUOUS PRN
Status: DISCONTINUED | OUTPATIENT
Start: 2019-06-17 | End: 2019-06-17

## 2019-06-17 RX ORDER — BUPIVACAINE HYDROCHLORIDE AND EPINEPHRINE 5; 5 MG/ML; UG/ML
INJECTION, SOLUTION EPIDURAL; INTRACAUDAL; PERINEURAL
Status: DISCONTINUED
Start: 2019-06-17 | End: 2019-06-17 | Stop reason: HOSPADM

## 2019-06-17 RX ORDER — HYDROCODONE BITARTRATE AND ACETAMINOPHEN 5; 325 MG/1; MG/1
1 TABLET ORAL
Status: COMPLETED | OUTPATIENT
Start: 2019-06-17 | End: 2019-06-17

## 2019-06-17 RX ORDER — HYDROCODONE BITARTRATE AND ACETAMINOPHEN 5; 325 MG/1; MG/1
1-2 TABLET ORAL EVERY 4 HOURS PRN
Qty: 18 TABLET | Refills: 0 | Status: SHIPPED | OUTPATIENT
Start: 2019-06-17 | End: 2021-03-15

## 2019-06-17 RX ORDER — LIDOCAINE HYDROCHLORIDE 10 MG/ML
INJECTION, SOLUTION INFILTRATION; PERINEURAL
Status: DISCONTINUED
Start: 2019-06-17 | End: 2019-06-17 | Stop reason: HOSPADM

## 2019-06-17 RX ORDER — ONDANSETRON 2 MG/ML
INJECTION INTRAMUSCULAR; INTRAVENOUS PRN
Status: DISCONTINUED | OUTPATIENT
Start: 2019-06-17 | End: 2019-06-17

## 2019-06-17 RX ORDER — EPHEDRINE SULFATE 50 MG/ML
INJECTION, SOLUTION INTRAMUSCULAR; INTRAVENOUS; SUBCUTANEOUS PRN
Status: DISCONTINUED | OUTPATIENT
Start: 2019-06-17 | End: 2019-06-17

## 2019-06-17 RX ORDER — FENTANYL CITRATE 50 UG/ML
25-50 INJECTION, SOLUTION INTRAMUSCULAR; INTRAVENOUS
Status: DISCONTINUED | OUTPATIENT
Start: 2019-06-17 | End: 2019-06-17 | Stop reason: HOSPADM

## 2019-06-17 RX ORDER — BUPIVACAINE HYDROCHLORIDE AND EPINEPHRINE 5; 5 MG/ML; UG/ML
INJECTION, SOLUTION EPIDURAL; INTRACAUDAL; PERINEURAL PRN
Status: DISCONTINUED | OUTPATIENT
Start: 2019-06-17 | End: 2019-06-17 | Stop reason: HOSPADM

## 2019-06-17 RX ORDER — MAGNESIUM HYDROXIDE 1200 MG/15ML
LIQUID ORAL PRN
Status: DISCONTINUED | OUTPATIENT
Start: 2019-06-17 | End: 2019-06-17 | Stop reason: HOSPADM

## 2019-06-17 RX ORDER — NALOXONE HYDROCHLORIDE 0.4 MG/ML
.1-.4 INJECTION, SOLUTION INTRAMUSCULAR; INTRAVENOUS; SUBCUTANEOUS
Status: DISCONTINUED | OUTPATIENT
Start: 2019-06-17 | End: 2019-06-17 | Stop reason: HOSPADM

## 2019-06-17 RX ORDER — SODIUM CHLORIDE, SODIUM LACTATE, POTASSIUM CHLORIDE, CALCIUM CHLORIDE 600; 310; 30; 20 MG/100ML; MG/100ML; MG/100ML; MG/100ML
INJECTION, SOLUTION INTRAVENOUS CONTINUOUS
Status: DISCONTINUED | OUTPATIENT
Start: 2019-06-17 | End: 2019-06-17 | Stop reason: HOSPADM

## 2019-06-17 RX ORDER — MEPERIDINE HYDROCHLORIDE 25 MG/ML
12.5 INJECTION INTRAMUSCULAR; INTRAVENOUS; SUBCUTANEOUS
Status: DISCONTINUED | OUTPATIENT
Start: 2019-06-17 | End: 2019-06-17 | Stop reason: HOSPADM

## 2019-06-17 RX ORDER — ONDANSETRON 2 MG/ML
4 INJECTION INTRAMUSCULAR; INTRAVENOUS EVERY 30 MIN PRN
Status: DISCONTINUED | OUTPATIENT
Start: 2019-06-17 | End: 2019-06-17 | Stop reason: HOSPADM

## 2019-06-17 RX ORDER — FENTANYL CITRATE 50 UG/ML
INJECTION, SOLUTION INTRAMUSCULAR; INTRAVENOUS PRN
Status: DISCONTINUED | OUTPATIENT
Start: 2019-06-17 | End: 2019-06-17

## 2019-06-17 RX ORDER — CEFAZOLIN SODIUM 1 G/3ML
1 INJECTION, POWDER, FOR SOLUTION INTRAMUSCULAR; INTRAVENOUS SEE ADMIN INSTRUCTIONS
Status: DISCONTINUED | OUTPATIENT
Start: 2019-06-17 | End: 2019-06-17 | Stop reason: HOSPADM

## 2019-06-17 RX ORDER — DEXAMETHASONE SODIUM PHOSPHATE 4 MG/ML
INJECTION, SOLUTION INTRA-ARTICULAR; INTRALESIONAL; INTRAMUSCULAR; INTRAVENOUS; SOFT TISSUE PRN
Status: DISCONTINUED | OUTPATIENT
Start: 2019-06-17 | End: 2019-06-17

## 2019-06-17 RX ORDER — CEFAZOLIN SODIUM 2 G/100ML
2 INJECTION, SOLUTION INTRAVENOUS
Status: COMPLETED | OUTPATIENT
Start: 2019-06-17 | End: 2019-06-17

## 2019-06-17 RX ORDER — PROPOFOL 10 MG/ML
INJECTION, EMULSION INTRAVENOUS PRN
Status: DISCONTINUED | OUTPATIENT
Start: 2019-06-17 | End: 2019-06-17

## 2019-06-17 RX ORDER — LIDOCAINE HYDROCHLORIDE 20 MG/ML
INJECTION, SOLUTION INFILTRATION; PERINEURAL PRN
Status: DISCONTINUED | OUTPATIENT
Start: 2019-06-17 | End: 2019-06-17

## 2019-06-17 RX ADMIN — KETOROLAC TROMETHAMINE 30 MG: 30 INJECTION, SOLUTION INTRAMUSCULAR at 11:52

## 2019-06-17 RX ADMIN — FENTANYL CITRATE 50 MCG: 50 INJECTION, SOLUTION INTRAMUSCULAR; INTRAVENOUS at 11:39

## 2019-06-17 RX ADMIN — ONDANSETRON 4 MG: 2 INJECTION INTRAMUSCULAR; INTRAVENOUS at 11:35

## 2019-06-17 RX ADMIN — SODIUM CHLORIDE, POTASSIUM CHLORIDE, SODIUM LACTATE AND CALCIUM CHLORIDE: 600; 310; 30; 20 INJECTION, SOLUTION INTRAVENOUS at 12:00

## 2019-06-17 RX ADMIN — FENTANYL CITRATE 50 MCG: 50 INJECTION, SOLUTION INTRAMUSCULAR; INTRAVENOUS at 11:24

## 2019-06-17 RX ADMIN — Medication 5 MG: at 11:51

## 2019-06-17 RX ADMIN — HYDROCODONE BITARTRATE AND ACETAMINOPHEN 1 TABLET: 5; 325 TABLET ORAL at 13:06

## 2019-06-17 RX ADMIN — PHENYLEPHRINE HYDROCHLORIDE 50 MCG: 10 INJECTION INTRAVENOUS at 12:00

## 2019-06-17 RX ADMIN — SODIUM CHLORIDE, POTASSIUM CHLORIDE, SODIUM LACTATE AND CALCIUM CHLORIDE: 600; 310; 30; 20 INJECTION, SOLUTION INTRAVENOUS at 11:24

## 2019-06-17 RX ADMIN — Medication 5 MG: at 11:57

## 2019-06-17 RX ADMIN — CEFAZOLIN SODIUM 2 G: 2 INJECTION, SOLUTION INTRAVENOUS at 11:34

## 2019-06-17 RX ADMIN — LIDOCAINE HYDROCHLORIDE 100 MG: 20 INJECTION, SOLUTION INFILTRATION; PERINEURAL at 11:30

## 2019-06-17 RX ADMIN — DEXAMETHASONE SODIUM PHOSPHATE 4 MG: 4 INJECTION, SOLUTION INTRA-ARTICULAR; INTRALESIONAL; INTRAMUSCULAR; INTRAVENOUS; SOFT TISSUE at 11:35

## 2019-06-17 RX ADMIN — PROPOFOL 200 MG: 10 INJECTION, EMULSION INTRAVENOUS at 11:30

## 2019-06-17 RX ADMIN — MIDAZOLAM 2 MG: 1 INJECTION INTRAMUSCULAR; INTRAVENOUS at 11:24

## 2019-06-17 RX ADMIN — Medication 5 MG: at 11:37

## 2019-06-17 RX ADMIN — Medication 5 MG: at 11:45

## 2019-06-17 ASSESSMENT — MIFFLIN-ST. JEOR: SCORE: 1537.19

## 2019-06-17 NOTE — ANESTHESIA CARE TRANSFER NOTE
Patient: Daniella Harris    Procedure(s):  OPEN UMBILICAL HERNIA REPAIR WITH MESH    Diagnosis: UMBILICAL HERNIA  Diagnosis Additional Information: No value filed.    Anesthesia Type:   General, LMA     Note:  Airway :Face Mask  Patient transferred to:PACU  Comments: Pt to PACU with O2 via mask, airway patent, VSS.  Report to RN.Handoff Report: Identifed the Patient, Identified the Reponsible Provider, Reviewed the pertinent medical history, Discussed the surgical course, Reviewed Intra-OP anesthesia mangement and issues during anesthesia, Set expectations for post-procedure period and Allowed opportunity for questions and acknowledgement of understanding      Vitals: (Last set prior to Anesthesia Care Transfer)    CRNA VITALS  6/17/2019 1137 - 6/17/2019 1211      6/17/2019             Resp Rate (set):  10                Electronically Signed By: SUSSY Watson CRNA  June 17, 2019  12:11 PM

## 2019-06-17 NOTE — OP NOTE
General Surgery Operative Note    PREOPERATIVE DIAGNOSIS:  UMBILICAL HERNIA    POSTOPERATIVE DIAGNOSIS:  Umbilical Hernia    PROCEDURE:  OPEN UMBILICAL HERNIA REPAIR WITH MESH    ANESTHESIA:  General.    PREOPERATIVE MEDICATIONS:  Ancef iv    SURGEON:  Marco A Martel M.D.    ASSISTANT:  Josh Rodriguez PA-C, Physician assistant first assistant was necessary during this procedure due to expertise in patient positioning, prepping, incisional opening, retraction, exposure, and suctioning.    INDICATIONS:  Symptomatic umbilical Hernia. Options thoroughly discussed in the office. Risks and alternatives reviewed. To proceed with open hernia repair with mesh.    DESCRIPTION OF PROCEDURE: The patient was taken to the operating suite and placed under general anesthetic. The abdomen was prepped and draped in a sterile fashion. Prophylactic antibiotics were administered.  Surgeon initiated timeout was acknowledged. We made a curvilinear incision just below the umbilicus.  This incision was carried down through the subcutaneous tissues.  The umbilical skin was mobilized from the underlying hernia sac.  The hernia sac was dissected down to the level of the fascial margins. The margins of the fascia were thoroughly dissected and the hernia sac and its contents were reduced. Additional preperitoneal dissection was performed around the margins of the defect. PCO 4VP was then introduced. This was deployed through the defect. This laid out flat in the preperitoneal space. The tails of the mesh were cut and the fascia was closed overlying the mesh, incorporating mesh fixation to the cut tails using interrupted 0 Vicryl suture.  Umbilical plasty was then performed suturing the umbilical skin back to the closed fascia with a 3-0 Vicryl suture.  Deep subcu was closed with 3-0 Vicryl stitch. Skin incision was closed with subcuticular 4-0 Vicryl stitch. Benzoin and Steri-Strips were applied. Needle and sponge counts were correct. The  patient tolerated this   well and was taken from the operating room to the recovery room in stable condition.  condition.       ESTIMATED BLOOD LOSS:  2cc      Marco A Martel MD

## 2019-06-17 NOTE — BRIEF OP NOTE
[facility]    Brief Operative Note    Pre-operative diagnosis: UMBILICAL HERNIA  Post-operative diagnosis Umbilical Hernia  Procedure: Procedure(s):  OPEN UMBILICAL HERNIA REPAIR WITH MESH  Surgeon: Surgeon(s) and Role:     * Marco A Martel MD - Primary     * Josh Rodriguez PA-C - Assisting  Anesthesia: Monitor Anesthesia Care   Estimated blood loss: 5 mL  Drains: None  Specimens: * No specimens in log *  Findings:   None.  Complications: None.  Implants:    Implant Name Type Inv. Item Serial No.  Lot No. LRB No. Used   MESH COMPOSITE W/COLLAGEN 4CM OPEN VENTRAL PARIETEX PCO4VP Mesh MESH COMPOSITE W/COLLAGEN 4CM OPEN VENTRAL PARIETEX PCO4VP  [facility] [id number] N/A 1     See Operative Report for full details.  No complications noted.

## 2019-06-17 NOTE — ANESTHESIA PREPROCEDURE EVALUATION
Anesthesia Pre-Procedure Evaluation    Patient: Daniella Harris   MRN: [id number] : 1975          Preoperative Diagnosis: UMBILICAL HERNIA    Procedure(s):  OPEN UMBILICAL HERNIA REPAIR WITH MESH    Past Medical History:   Diagnosis Date     Depression, major, in remission (H)      Multiple scleroses     Dr. Mejia     Past Surgical History:   Procedure Laterality Date     achilles tendon surgery Left 2018     GYN SURGERY       HEAD & NECK SURGERY       LAPAROSCOPIC SINGLE SITE TOTAL HYSTERECTOMY      for bleeding not cancer     ORTHOPEDIC SURGERY       wisdom teeth removed  18       Anesthesia Evaluation     .             ROS/MED HX    ENT/Pulmonary:  - neg pulmonary ROS     Neurologic:     (+)Multiple Sclerosis     Cardiovascular:  - neg cardiovascular ROS       METS/Exercise Tolerance:     Hematologic:  - neg hematologic  ROS       Musculoskeletal:  - neg musculoskeletal ROS       GI/Hepatic:  - neg GI/hepatic ROS   (+) Other GI/Hepatic umbilical hernia      Renal/Genitourinary:  - ROS Renal section negative       Endo:  - neg endo ROS       Psychiatric:     (+) psychiatric history depression      Infectious Disease:  - neg infectious disease ROS       Malignancy:         Other:                                 Lab Results   Component Value Date    WBC 5.6 2012    HGB 12.3 2018    HCT 33.2 (L) 2012     2012    SED 12 2012    ALBUMIN 4.3 2012    PROTTOTAL 7.2 2012    ALT 51 (H) 2012    AST 43 2012    ALKPHOS 46 2012    BILITOTAL 0.5 2012    TSH 2.66 2012       Preop Vitals  BP Readings from Last 3 Encounters:   19 111/71   19 107/69   19 102/64    Pulse Readings from Last 3 Encounters:   19 58   19 62   18 66      Resp Readings from Last 3 Encounters:   19 16   18 14    SpO2 Readings from Last 3 Encounters:   19 99%   19 100%   18 100%      Temp  Readings from Last 1 Encounters:   06/17/19 36.2  C (97.2  F) (Oral)    Ht Readings from Last 1 Encounters:   06/17/19 1.829 m (6')      Wt Readings from Last 1 Encounters:   06/17/19 78 kg (172 lb)    Estimated body mass index is 23.33 kg/m  as calculated from the following:    Height as of this encounter: 1.829 m (6').    Weight as of this encounter: 78 kg (172 lb).       Anesthesia Plan      History & Physical Review  History and physical reviewed and following examination; no interval change.    ASA Status:  2 .    NPO Status:  > 8 hours    Plan for General and LMA with Intravenous induction. Maintenance will be Balanced.    PONV prophylaxis:  Ondansetron (or other 5HT-3) and Dexamethasone or Solumedrol       Postoperative Care  Postoperative pain management:  IV analgesics.      Consents  Anesthetic plan, risks, benefits and alternatives discussed with:  Patient..                 Sarbjit Claudio, DO, DO

## 2019-06-17 NOTE — DISCHARGE INSTRUCTIONS
Same Day Surgery Discharge Instructions for  Sedation and General Anesthesia       It's not unusual to feel dizzy, light-headed or faint for up to 24 hours after surgery or while taking pain medication.  If you have these symptoms: sit for a few minutes before standing and have someone assist you when you get up to walk or use the bathroom.      You should rest and relax for the next 24 hours. We recommend you make arrangements to have an adult stay with you for at least 24 hours after your discharge.  Avoid hazardous and strenuous activity.      DO NOT DRIVE any vehicle or operate mechanical equipment for 24 hours following the end of your surgery.  Even though you may feel normal, your reactions may be affected by the medication you have received.      Do not drink alcoholic beverages for 24 hours following surgery.       Slowly progress to your regular diet as you feel able. It's not unusual to feel nauseated and/or vomit after receiving anesthesia.  If you develop these symptoms, drink clear liquids (apple juice, ginger ale, broth, 7-up, etc. ) until you feel better.  If your nausea and vomiting persists for 24 hours, please notify your surgeon.        All narcotic pain medications, along with inactivity and anesthesia, can cause constipation. Drinking plenty of liquids and increasing fiber intake will help.      For any questions of a medical nature, call your surgeon.      Do not make important decisions for 24 hours.      If you had general anesthesia, you may have a sore throat for a couple of days related to the breathing tube used during surgery.  You may use Cepacol lozenges to help with this discomfort.  If it worsens or if you develop a fever, contact your surgeon.       If you feel your pain is not well managed with the pain medications prescribed by your surgeon, please contact your surgeon's office to let them know so they can address your concerns.       ***If you have questions or concerns about  your procedure,  call Dr. Martel at [phone]**    [facility]  Discharge Instructions: Post-Operative Open Umbilical or Ventral Hernia    ACTIVITY    Take frequent, short walks and increase your activity gradually.      Avoid strenuous physical activity or heavy lifting greater than 15lbs. for 3-4 weeks.  You may climb stairs.    You may drive without restrictions when you are not using any prescription pain medication and feel comfortable in a car.    You may return to work/school when you are comfortable without any prescription pain medication.    WOUND CARE    You may remove your bandage and shower 48 hours after the surgery.  Pat your incision dry and leave it open to air.  Re-apply dressing (Band-Aids or gauze/tape) as needed for comfort or drainage.    You may have steri-strips (looks like white tape) on your incision.  You may peel off the steri-strips 2 weeks after your surgery if they have not peeled off on their own.     Do not soak your incision in a tub or pool for 2 weeks.     Do not apply any lotions, creams, or ointments to your incision.    A ridge under your incision is normal and will gradually resolve.    DIET    Start with liquids, then gradually resume your regular diet as tolerated.     Drink plenty of fluids to stay hydrated.    PAIN    Expect some tenderness and discomfort at the incision site(s).  Use the prescribed pain medication at your discretion.  Expect gradual resolution of your pain over several days.    You may take ibuprofen with food (unless you have been told not to) instead of or in addition to your prescribed pain medication.  If you are taking Norco or Percocet, do not take any additional acetaminophen/APAP/Tylenol.      Do not drink alcohol or drive while you are taking pain medications.    You may apply ice to your incisions in 20 minute intervals as needed for the next 48 hours.  After that time, consider switching to heat if you  prefer.    EXPECTATIONS    Pain medications can cause constipation.  Limit use when possible.  Take over the counter stool softener/stimulant, such as Colace or Senna, 1-2 times a day with plenty of water.  You may take a mild over the counter laxative, such as Miralax or a suppository, as needed.  You may take 1 oz. (2 tablespoons) Milk of Magnesia the evening following surgery to encourage bowel movement.    You may discontinue these medications once you are having regular bowel movements and/or are no longer taking your narcotic pain medication.      FOLLOW UP    Our office will contact you approximately 2 weeks to check on your progress and answer any questions you may have.  If you are doing well, you will not need to return for a follow up appointment.  If any concerns are identified over the phone, we will help you make an appointment to see a provider.     If you have not received a phone call, have any questions or concerns, or would like to be seen, please call us at [phone] and ask to speak with our nurse.  We are located at [address] [place] [address].    CALL OUR OFFICE [phone] IF YOU HAVE:     Chills or fever above 101 F.    Increased redness, warmth, or drainage at your incisions.    Significant bleeding.    Pain not relieved by your pain medication or rest.    Increasing pain after the first 48 hours.    Any other concerns or questions.    Revised January 2018    Today you received Toradol, an antiinflammatory medication similar to Ibuprofen.  You should not take other antiinflammatory medication, such as Ibuprofen, Motrin, Advil, Aleve, Naprosyn, etc until 5:50pm

## 2019-06-17 NOTE — ANESTHESIA POSTPROCEDURE EVALUATION
Patient: Daniella Harris    Procedure(s):  OPEN UMBILICAL HERNIA REPAIR WITH MESH    Diagnosis:UMBILICAL HERNIA  Diagnosis Additional Information: No value filed.    Anesthesia Type:  General, LMA    Note:  Anesthesia Post Evaluation    Patient location during evaluation: bedside  Patient participation: Able to fully participate in evaluation  Level of consciousness: awake  Pain management: adequate  Airway patency: patent  Cardiovascular status: acceptable  Respiratory status: acceptable  Hydration status: acceptable  PONV: none     Anesthetic complications: None    Comments: No anesthetic complications noted.         Last vitals:  Vitals:    06/17/19 1300 06/17/19 1330 06/17/19 1425   BP: 108/67 106/64 104/70   Pulse: 52 58    Resp: 10 10 12   Temp: 36.6  C (97.9  F)     SpO2: 100% 100% 99%         Electronically Signed By: Sarbjit Claudio DO, DO  June 17, 2019  4:26 PM

## 2019-07-02 ENCOUNTER — TELEPHONE (OUTPATIENT)
Dept: SURGERY | Facility: CLINIC | Age: 44
End: 2019-07-02

## 2019-07-02 NOTE — TELEPHONE ENCOUNTER
[facility]  Post op call note - No Answer    Daniella Harris was called for an update regarding her recovery.  There was no answer and a message was left instructing the patient to call the office with any questions or concerns.     Josh Rodriguez PA-C

## 2021-01-15 ENCOUNTER — HEALTH MAINTENANCE LETTER (OUTPATIENT)
Age: 46
End: 2021-01-15

## 2021-03-15 ENCOUNTER — DOCUMENTATION ONLY (OUTPATIENT)
Dept: LAB | Facility: CLINIC | Age: 46
End: 2021-03-15

## 2021-03-15 ENCOUNTER — OFFICE VISIT (OUTPATIENT)
Dept: FAMILY MEDICINE | Facility: CLINIC | Age: 46
End: 2021-03-15
Payer: COMMERCIAL

## 2021-03-15 ENCOUNTER — TELEPHONE (OUTPATIENT)
Dept: FAMILY MEDICINE | Facility: CLINIC | Age: 46
End: 2021-03-15

## 2021-03-15 VITALS
OXYGEN SATURATION: 100 % | HEIGHT: 72 IN | RESPIRATION RATE: 16 BRPM | SYSTOLIC BLOOD PRESSURE: 127 MMHG | WEIGHT: 176 LBS | BODY MASS INDEX: 23.84 KG/M2 | HEART RATE: 64 BPM | TEMPERATURE: 98.6 F | DIASTOLIC BLOOD PRESSURE: 90 MMHG

## 2021-03-15 DIAGNOSIS — R30.0 DYSURIA: Primary | ICD-10-CM

## 2021-03-15 DIAGNOSIS — F32.5 DEPRESSION, MAJOR, IN REMISSION (H): ICD-10-CM

## 2021-03-15 LAB
ALBUMIN UR-MCNC: NEGATIVE MG/DL
APPEARANCE UR: CLEAR
BACTERIA #/AREA URNS HPF: ABNORMAL /HPF
BILIRUB UR QL STRIP: NEGATIVE
COLOR UR AUTO: YELLOW
GLUCOSE UR STRIP-MCNC: NEGATIVE MG/DL
HGB UR QL STRIP: NEGATIVE
KETONES UR STRIP-MCNC: NEGATIVE MG/DL
LEUKOCYTE ESTERASE UR QL STRIP: ABNORMAL
NITRATE UR QL: POSITIVE
PH UR STRIP: 7 PH (ref 5–7)
RBC #/AREA URNS AUTO: ABNORMAL /HPF
SOURCE: ABNORMAL
SP GR UR STRIP: 1.02 (ref 1–1.03)
UROBILINOGEN UR STRIP-ACNC: 0.2 EU/DL (ref 0.2–1)
WBC #/AREA URNS AUTO: ABNORMAL /HPF

## 2021-03-15 PROCEDURE — 87088 URINE BACTERIA CULTURE: CPT | Performed by: NURSE PRACTITIONER

## 2021-03-15 PROCEDURE — 81001 URINALYSIS AUTO W/SCOPE: CPT | Performed by: NURSE PRACTITIONER

## 2021-03-15 PROCEDURE — 87186 SC STD MICRODIL/AGAR DIL: CPT | Performed by: NURSE PRACTITIONER

## 2021-03-15 PROCEDURE — 99215 OFFICE O/P EST HI 40 MIN: CPT | Performed by: NURSE PRACTITIONER

## 2021-03-15 PROCEDURE — 87086 URINE CULTURE/COLONY COUNT: CPT | Performed by: NURSE PRACTITIONER

## 2021-03-15 PROCEDURE — 96127 BRIEF EMOTIONAL/BEHAV ASSMT: CPT | Performed by: NURSE PRACTITIONER

## 2021-03-15 RX ORDER — CIPROFLOXACIN 500 MG/1
500 TABLET, FILM COATED ORAL 2 TIMES DAILY
Qty: 5 TABLET | Refills: 0 | Status: SHIPPED | OUTPATIENT
Start: 2021-03-15 | End: 2022-06-13

## 2021-03-15 ASSESSMENT — PATIENT HEALTH QUESTIONNAIRE - PHQ9: SUM OF ALL RESPONSES TO PHQ QUESTIONS 1-9: 4

## 2021-03-15 ASSESSMENT — MIFFLIN-ST. JEOR: SCORE: 1545.33

## 2021-03-15 NOTE — TELEPHONE ENCOUNTER
Reason for Call: Request for an order or referral:    Order or referral being requested: blood work for ongoing uti     Date needed: as soon as possible    Has the patient been seen by the PCP for this problem? NO    Additional comments: saw a virtual, she had a uti and did a round of antibiotics. She missed two days of medication and is still having symptoms of uti. Was told by virtual to get bloodwork done    Phone number Patient can be reached at:  Work number on file:  There is no work phone number on file. or Cell number on file:    Telephone Information:   Mobile [phone]       Best Time:  any    Can we leave a detailed message on this number?  YES    Call taken on 3/15/2021 at 8:30 AM by Rocío Mathew

## 2021-03-15 NOTE — PROGRESS NOTES
Assessment & Plan     Dysuria  Had virtuwell 02/21/2021 for frequency, cloudy no burning  Then treated with Keflex 500 mg #14 one a day for 12 days ( missed days 13 and 14  ).  Got covid shot on 03/04/2021.  Persistent smell to urine and cloudy .  No vaginal discharge. No frequency, no fever. No back pain or abdominal pain  No concern of STD's     - UA reflex to Microscopic  - Urine Microscopic  - Urine Culture Aerobic Bacterial  - ciprofloxacin (CIPRO) 500 MG tablet; Take 1 tablet (500 mg) by mouth 2 times daily  Differential diagnosis includes UTI, BV ( declines test at this time )     Depression, major, in remission (H)  See PHQ recent divorce :   - EMOTIONAL / BEHAVIORAL ASSESSMENT    [facility]  40 minutes spent on the date of the encounter doing chart review, review of test results, patient visit and documentation        See Patient Instructions    No follow-ups on file.    SUSSY [facility] is a 45 year old who presents for the following health issues     HPI   Had virtuwell 02/21/2021 for frequency, cloudy no burning  Then treated with Keflex 500 mg #14 one a day for 12 days ( missed days 13 and 14  ).  Got covid shot on 03/04/2021.  Persistent smell to urine and cloudy .  No vaginal discharge. No frequency, no fever. No back pain or abdominal pain  No concern of STD's     Genitourinary - Female  Onset/Duration: weeks  Description:   Painful urination (Dysuria): no           Frequency: YES  Blood in urine (Hematuria): no  Delay in urine (Hesitency): no  Intensity: mild  Progression of Symptoms:  same  Accompanying Signs & Symptoms:  Fever/chills: no  Flank pain: no  Nausea and vomiting: no  Vaginal symptoms: none  Abdominal/Pelvic Pain: no  History:   History of frequent UTI s: no  History of kidney stones: no  Sexually Active: no  Possibility of pregnancy:  No  Precipitating or alleviating factors: None  Therapies tried and outcome: Keflex 500 mg daily and did not complete     New Patient/Transfer of Care    Review of Systems   Constitutional, HEENT, cardiovascular, pulmonary, gi and gu systems are negative, except as otherwise noted.      Objective    LMP 05/15/2012   There is no height or weight on file to calculate BMI.  Physical Exam   GENERAL: healthy, alert and no distress  NECK: no adenopathy, no asymmetry, masses, or scars and thyroid normal to palpation  RESP: lungs clear to auscultation - no rales, rhonchi or wheezes  CV: regular rate and rhythm, normal S1 S2, no S3 or S4, no murmur, click or rub, no peripheral edema and peripheral pulses strong  ABDOMEN: soft, nontender, no hepatosplenomegaly, no masses and bowel sounds normal  MS: no gross musculoskeletal defects noted, no edema    Results for orders placed or performed in visit on 03/15/21 (from the past 24 hour(s))   UA reflex to Microscopic   Result Value Ref Range    Color Urine Yellow     Appearance Urine Clear     Glucose Urine Negative NEG^Negative mg/dL    Bilirubin Urine Negative NEG^Negative    Ketones Urine Negative NEG^Negative mg/dL    Specific Gravity Urine 1.020 1.003 - 1.035    Blood Urine Negative NEG^Negative    pH Urine 7.0 5.0 - 7.0 pH    Protein Albumin Urine Negative NEG^Negative mg/dL    Urobilinogen Urine 0.2 0.2 - 1.0 EU/dL    Nitrite Urine Positive (A) NEG^Negative    Leukocyte Esterase Urine Trace (A) NEG^Negative    Source Midstream Urine    Urine Microscopic   Result Value Ref Range    WBC Urine 0 - 5 OTO5^0 - 5 /HPF    RBC Urine O - 2 OTO2^O - 2 /HPF    Bacteria Urine Many (A) NEG^Negative /HPF       If symptoms persist then consider a Wet prep, declined today    Leah Constantino CNP (Lori)

## 2021-03-15 NOTE — PATIENT INSTRUCTIONS

## 2021-03-15 NOTE — TELEPHONE ENCOUNTER
Detailed message notifying pt she needs an appointment and not just lab visit     Last visit here was in 2019    Leah ROWELL RN

## 2021-03-15 NOTE — TELEPHONE ENCOUNTER
I suspect this needs to have team visit today, please triage, last seen by me 2019    Janusz Aranda M.D.

## 2021-03-15 NOTE — PROGRESS NOTES
Patient has lab appointment tomorrow morning, but no orders are in.   Please place orders, if needed.  Thank you Lab,

## 2021-03-16 ENCOUNTER — MYC MEDICAL ADVICE (OUTPATIENT)
Dept: FAMILY MEDICINE | Facility: CLINIC | Age: 46
End: 2021-03-16

## 2021-03-16 NOTE — TELEPHONE ENCOUNTER
LH,  Please see below [place] [employer] message and advise.  Rx for BID but #5 tablets   Suzi BRIDGES RN

## 2021-03-17 LAB
BACTERIA SPEC CULT: ABNORMAL
Lab: ABNORMAL
SPECIMEN SOURCE: ABNORMAL

## 2021-03-17 NOTE — TELEPHONE ENCOUNTER
Contacted patient by My Chart and pharmacy to correct error.  An additional 5 tablets will be delivered.  Leah Constantino CNP (Lori)

## 2021-03-18 NOTE — RESULT ENCOUNTER NOTE
Beck Brewer,    Your urine culture shows that Cipro is effective against the bacteria.   Please let us know if you are not improved.   Remember to urinate after each sexual encounter to flush out the urinary tract.   Follow up as needed  Leah Constantino (Lori) CNP

## 2021-03-20 ENCOUNTER — HEALTH MAINTENANCE LETTER (OUTPATIENT)
Age: 46
End: 2021-03-20

## 2021-06-17 ENCOUNTER — TRANSFERRED RECORDS (OUTPATIENT)
Dept: HEALTH INFORMATION MANAGEMENT | Facility: CLINIC | Age: 46
End: 2021-06-17

## 2021-09-04 ENCOUNTER — HEALTH MAINTENANCE LETTER (OUTPATIENT)
Age: 46
End: 2021-09-04

## 2022-02-19 ENCOUNTER — HEALTH MAINTENANCE LETTER (OUTPATIENT)
Age: 47
End: 2022-02-19

## 2022-06-10 ASSESSMENT — ENCOUNTER SYMPTOMS
BREAST MASS: 0
CHILLS: 0
SHORTNESS OF BREATH: 0
CONSTIPATION: 0
NAUSEA: 0
PARESTHESIAS: 1
DYSURIA: 0
COUGH: 0
PALPITATIONS: 0
FREQUENCY: 0
HEADACHES: 0
ABDOMINAL PAIN: 0
SORE THROAT: 0
JOINT SWELLING: 0
DIZZINESS: 0
EYE PAIN: 0
NERVOUS/ANXIOUS: 0
HEMATOCHEZIA: 0
DIARRHEA: 0
FEVER: 0
ARTHRALGIAS: 0
HEARTBURN: 1
MYALGIAS: 1
WEAKNESS: 0
HEMATURIA: 0

## 2022-06-10 ASSESSMENT — PATIENT HEALTH QUESTIONNAIRE - PHQ9
10. IF YOU CHECKED OFF ANY PROBLEMS, HOW DIFFICULT HAVE THESE PROBLEMS MADE IT FOR YOU TO DO YOUR WORK, TAKE CARE OF THINGS AT HOME, OR GET ALONG WITH OTHER PEOPLE: NOT DIFFICULT AT ALL
SUM OF ALL RESPONSES TO PHQ QUESTIONS 1-9: 2
SUM OF ALL RESPONSES TO PHQ QUESTIONS 1-9: 2

## 2022-06-10 NOTE — PROGRESS NOTES
SUBJECTIVE:   CC: Daniella Harris is an 46 year old woman who presents for preventive health visit.       Patient has been advised of split billing requirements and indicates understanding: Yes  Healthy Habits:     Getting at least 3 servings of Calcium per day:  NO    Bi-annual eye exam:  Yes    Dental care twice a year:  Yes    Sleep apnea or symptoms of sleep apnea:  None    Diet:  Other    Frequency of exercise:  1 day/week    Duration of exercise:  15-30 minutes    Taking medications regularly:  Yes    Medication side effects:  Muscle aches    PHQ-2 Total Score: 2    Additional concerns today:  Yes  1) Well adult  Has had OBGYN visit  No pap indicated  Had hysterectomy  Had STD screens done through another clinic    NOHEMY MAMMOGRAM DIAGNOSTIC BILAT DIGITAL    Anatomical Region Laterality Modality   Breast bilateral Mammography       Impression    Impression:   BI-RADS category 2, benign findings.  Recommend annual screening mammography.     The patient received these results and recommendations before leaving the radiology department on the day of her diagnostic mammogram. In addition, the patient will be notified of the results by mail.     This mammogram was performed and interpreted with tomosynthesis.     R2 Computer Aided Detection (CAD) with radiologist review and interpretation was performed on this mammogram.     I have personally reviewed the image(s) and initial interpretation, and I agree with the findings as documented by the resident/fellow.       Reading Radiologist: Severt, Anthony   Reading Resident: Alex Gutierres BREAST LEFT    Anatomical Region Laterality Modality   Breast left Ultrasound       Impression    Impression:   BI-RADS category 2, benign findings.  Recommend annual screening mammography.     The patient received these results and recommendations before leaving the radiology department on the day of her diagnostic mammogram. In addition, the patient will be notified of the  results by mail.     This mammogram was performed and interpreted with tomosynthesis.     R2 Computer Aided Detection (CAD) with radiologist review and interpretation was performed on this mammogram.     I have personally reviewed the image(s) and initial interpretation, and I agree with the findings as documented by the resident/fellow.       Reading Radiologist: Severt, Anthony   Reading Resident: Alex Gutierres       2)  General Soreness:  Worse in the last 9 months  As day goes on gets better with stretching/yoga    3)  Numbness in right arm burning neck stiffness  No weakness  Present about 15 months  No known trauma  Lots off stress at work  Previously had a job with [employer] a [employer] on her lft side  Feels neck is really tight    4) urinary incontinence/urgency  Not sure if this is MS related  Had 3 vaginal births    (G35) Multiple sclerosis (H)  Comment: stable and [facility]  No changes to medications        (F32.5) Depression, major, in remission (H)  Comment: stable and has a provider that manged this  Has not noted any serotonin syndrome symptoms    (Z12.11) Screen for colon cancer  Comment:   Plan: Adult Gastro Ref - Procedure Only            (Z13.220) Screening for hyperlipidemia  Comment:   Plan: Lipid panel reflex to direct LDL Fasting             Mammogram done on this date: 2022 (approximately), by this group: [facility], results were negative.            Today's PHQ-2 Score:   PHQ-2 ( 1999 [employer]) 6/10/2022   Q1: Little interest or pleasure in doing things 1   Q2: Feeling down, depressed or hopeless 1   PHQ-2 Score 2   PHQ-2 Total Score (12-17 Years)- Positive if 3 or more points; Administer PHQ-A if positive -   Q1: Little interest or pleasure in doing things Several days   Q2: Feeling down, depressed or hopeless Several days   PHQ-2 Score 2       Abuse: Current or Past (Physical, Sexual or Emotional) - No  Do you feel safe in your environment? Yes    Have you ever  done Advance Care Planning? (For example, a Health Directive, POLST, or a discussion with a medical provider or your loved ones about your wishes): No, advance care planning information given to patient to review.  Patient declined advance care planning discussion at this time.    Social History     Tobacco Use     Smoking status: Never Smoker     Smokeless tobacco: Never Used     Tobacco comment: None   Substance Use Topics     Alcohol use: Yes     Comment: Social     If you drink alcohol do you typically have >3 drinks per day or >7 drinks per week? No     No flowsheet data found.     Reviewed orders with patient.  Reviewed health maintenance and updated orders accordingly - Yes  Lab work is in process  Labs reviewed in EPIC  BP Readings from Last 3 Encounters:   06/13/22 103/68   03/15/21 (!) 127/90   06/17/19 104/70    Wt Readings from Last 3 Encounters:   06/13/22 77.1 kg (170 lb)   03/15/21 79.8 kg (176 lb)   06/17/19 78 kg (172 lb)                  Patient Active Problem List   Diagnosis     Multiple scleroses     Depression, major, in remission (H)     Advanced directives, counseling/discussion     Past Surgical History:   Procedure Laterality Date     achilles tendon surgery Left 03/06/2018     GYN SURGERY       HEAD & NECK SURGERY       HERNIORRHAPHY UMBILICAL N/A 6/17/2019    Procedure: OPEN UMBILICAL HERNIA REPAIR WITH MESH;  Surgeon: Marco A Martel MD;  Location: [facility] OR     LAPAROSCOPIC SINGLE SITE TOTAL HYSTERECTOMY  2011    for bleeding not cancer     ORTHOPEDIC SURGERY       wisdom teeth removed  18       Social History     Tobacco Use     Smoking status: Never Smoker     Smokeless tobacco: Never Used     Tobacco comment: None   Substance Use Topics     Alcohol use: Yes     Comment: Social     Family History   Problem Relation Age of Onset     Diabetes Mother            Breast Cancer Screening:  Any new diagnosis of family breast, ovarian, or bowel cancer? No    FHS-7:   Breast CA Risk  Assessment (FHS-7) 6/10/2022   Did any of your first-degree relatives have breast or ovarian cancer? Yes   Did any of your relatives have bilateral breast cancer? Yes   Did any man in your family have breast cancer? No   Did any woman in your family have breast and ovarian cancer? Yes   Did any woman in your family have breast cancer before age 50 y? No   Do you have 2 or more relatives with breast and/or ovarian cancer? No   Do you have 2 or more relatives with breast and/or bowel cancer? No       Mammogram Screening: Recommended annual mammography  Pertinent mammograms are reviewed under the imaging tab.    History of abnormal Pap smear: Status post benign hysterectomy. Health Maintenance and Surgical History updated.     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 Mejia      Past Surgical History:   Procedure Laterality Date     achilles tendon surgery Left 03/06/2018     GYN SURGERY       HEAD & NECK SURGERY       HERNIORRHAPHY UMBILICAL N/A 6/17/2019    Procedure: OPEN UMBILICAL HERNIA REPAIR WITH MESH;  Surgeon: Marco A Martel MD;  Location: [facility] OR     LAPAROSCOPIC SINGLE SITE TOTAL HYSTERECTOMY  2011    for bleeding not cancer     ORTHOPEDIC SURGERY       wisdom teeth removed  16       Review of Systems   Constitutional: Negative for chills and fever.   HENT: Positive for ear pain. Negative for congestion, hearing loss and sore throat.    Eyes: Negative for pain and visual disturbance.   Respiratory: Negative for cough and shortness of breath.    Cardiovascular: Negative for chest pain,  palpitations and peripheral edema.   Gastrointestinal: Positive for heartburn. Negative for abdominal pain, constipation, diarrhea, hematochezia and nausea.   Breasts:  Negative for tenderness, breast mass and discharge.   Genitourinary: Positive for urgency. Negative for dysuria, frequency, genital sores, hematuria, pelvic pain, vaginal bleeding and vaginal discharge.   Musculoskeletal: Positive for myalgias. Negative for arthralgias and joint swelling.   Skin: Negative for rash.   Neurological: Positive for paresthesias. Negative for dizziness, weakness and headaches.   Psychiatric/Behavioral: Negative for mood changes. The patient is not nervous/anxious.      CONSTITUTIONAL: NEGATIVE for fever, chills, change in weight  INTEGUMENTARU/SKIN: NEGATIVE for worrisome rashes, moles or lesions  EYES: NEGATIVE for vision changes or irritation  ENT: NEGATIVE for ear, mouth and throat problems  RESP: NEGATIVE for significant cough or SOB  BREAST: NEGATIVE for masses, tenderness or discharge  CV: NEGATIVE for chest pain, palpitations or peripheral edema  GI: NEGATIVE for nausea, abdominal pain, heartburn, or change in bowel habits  : NEGATIVE for unusual urinary or vaginal symptoms. Periods are regular.  MUSCULOSKELETAL: NEGATIVE for significant arthralgias or myalgia  NEURO: NEGATIVE for weakness, dizziness or paresthesias  PSYCHIATRIC: NEGATIVE for changes in mood or affect     OBJECTIVE:   /68   Pulse 56   Temp (!) 96.6  F (35.9  C) (Temporal)   Resp 16   Wt 77.1 kg (170 lb)   LMP 05/15/2012   SpO2 97%   Breastfeeding No   BMI 23.06 kg/m    Physical Exam  GENERAL: healthy, alert and no distress  EYES: Eyes grossly normal to inspection, PERRL and conjunctivae and sclerae normal  HENT: ear canals and TM's normal, nose and mouth without ulcers or lesions  NECK: no adenopathy, no asymmetry, masses, or scars and thyroid normal to palpation  RESP: lungs clear to auscultation - no rales, rhonchi or  wheezes  BREAST: normal without masses, tenderness or nipple discharge and no palpable axillary masses or adenopathy  CV: regular rate and rhythm, normal S1 S2, no S3 or S4, no murmur, click or rub, no peripheral edema and peripheral pulses strong  ABDOMEN: soft, nontender, no hepatosplenomegaly, no masses and bowel sounds normal  MS: no gross musculoskeletal defects noted, no edema  SKIN: no suspicious lesions or rashes  NEURO: Normal strength and tone, mentation intact and speech normal  PSYCH: mentation appears normal, affect normal/bright    Diagnostic Test Results:  Labs reviewed in Epic    ASSESSMENT/PLAN:   (Z00.00) Routine general medical examination at a health care facility  (primary encounter diagnosis)  Comment:    Plan:      (G35) Multiple sclerosis (H)  Comment: stable  Unclear of te MS is causing some of her myalgia and urinary symptoms. Will try to assess this as noed below with labs  Plan:      (F32.5) Depression, major, in remission (H)  Comment: stable and well controlled no side effects from medications  Does not need refills sees another provider  Plan:     (R20.0) Arm numbness  Comment: possibly due to uypper trap tightness  Will try PT first if no improvement will seek imaging  Plan: Physical Therapy Referral, CANCELED: XR         Cervical Spine 2/3 Views            (M79.10) Myalgia  Comment: assessing vit D levels consider medication side effects versus MS  Plan: Basic metabolic panel  (Ca, Cl, CO2, Creat,         Gluc, K, Na, BUN), Vitamin D Deficiency, CK         total             (R39.15) Urinary urgency  Comment: consider pelvic floor weakness versus MS  Plan: UA with Microscopic reflex to Culture - lab         collect, Physical Therapy Referral            (M54.2) Neck pain  Comment:    Plan: Physical Therapy Referral, CANCELED: XR         Cervical Spine 2/3 Views             (Z12.11) Screen for colon cancer  Comment:    Plan: Adult Gastro Ref - Procedure Only             (Z13.220)  Screening for hyperlipidemia  Comment:    Plan: Lipid panel reflex to direct LDL Fasting               Patient has been advised of split billing requirements and indicates understanding: Yes    COUNSELING:  Reviewed preventive health counseling, as reflected in patient instructions       Regular exercise       Healthy diet/nutrition    Estimated body mass index is 23.06 kg/m  as calculated from the following:    Height as of 3/15/21: 1.829 m (6').    Weight as of this encounter: 77.1 kg (170 lb).        She reports that she has never smoked. She has never used smokeless tobacco.      Counseling Resources:  ATP IV Guidelines  Pooled Cohorts Equation Calculator  Breast Cancer Risk Calculator  BRCA-Related Cancer Risk Assessment: FHS-7 Tool  FRAX Risk Assessment  ICSI Preventive Guidelines  Dietary Guidelines for Americans, 2010  CC video's MyPlate  ASA Prophylaxis  Lung CA Screening    Mady Garcia MD  [facility]  Answers for HPI/ROS submitted by the patient on 6/10/2022  If you checked off any problems, how difficult have these problems made it for you to do your work, take care of things at home, or get along with other people?: Not difficult at all  PHQ9 TOTAL SCORE: 2

## 2022-06-13 ENCOUNTER — OFFICE VISIT (OUTPATIENT)
Dept: FAMILY MEDICINE | Facility: CLINIC | Age: 47
End: 2022-06-13
Payer: COMMERCIAL

## 2022-06-13 VITALS
WEIGHT: 170 LBS | HEART RATE: 56 BPM | TEMPERATURE: 96.6 F | RESPIRATION RATE: 16 BRPM | BODY MASS INDEX: 23.06 KG/M2 | DIASTOLIC BLOOD PRESSURE: 68 MMHG | SYSTOLIC BLOOD PRESSURE: 103 MMHG | OXYGEN SATURATION: 97 %

## 2022-06-13 DIAGNOSIS — Z00.00 ROUTINE GENERAL MEDICAL EXAMINATION AT A HEALTH CARE FACILITY: Primary | ICD-10-CM

## 2022-06-13 DIAGNOSIS — M79.10 MYALGIA: ICD-10-CM

## 2022-06-13 DIAGNOSIS — G35 MULTIPLE SCLEROSIS (H): ICD-10-CM

## 2022-06-13 DIAGNOSIS — Z13.220 SCREENING FOR HYPERLIPIDEMIA: ICD-10-CM

## 2022-06-13 DIAGNOSIS — Z12.11 SCREEN FOR COLON CANCER: ICD-10-CM

## 2022-06-13 DIAGNOSIS — R39.15 URINARY URGENCY: ICD-10-CM

## 2022-06-13 DIAGNOSIS — R20.0 ARM NUMBNESS: ICD-10-CM

## 2022-06-13 DIAGNOSIS — M54.2 NECK PAIN: ICD-10-CM

## 2022-06-13 DIAGNOSIS — F32.5 DEPRESSION, MAJOR, IN REMISSION (H): ICD-10-CM

## 2022-06-13 PROBLEM — G83.10: Status: RESOLVED | Noted: 2022-06-13 | Resolved: 2022-06-13

## 2022-06-13 PROBLEM — G83.10: Status: ACTIVE | Noted: 2022-06-13

## 2022-06-13 LAB
ALBUMIN UR-MCNC: NEGATIVE MG/DL
ANION GAP SERPL CALCULATED.3IONS-SCNC: 4 MMOL/L (ref 3–14)
APPEARANCE UR: CLEAR
BILIRUB UR QL STRIP: NEGATIVE
BUN SERPL-MCNC: 14 MG/DL (ref 7–30)
CALCIUM SERPL-MCNC: 9.5 MG/DL (ref 8.5–10.1)
CHLORIDE BLD-SCNC: 107 MMOL/L (ref 94–109)
CHOLEST SERPL-MCNC: 199 MG/DL
CK SERPL-CCNC: 142 U/L (ref 30–225)
CO2 SERPL-SCNC: 31 MMOL/L (ref 20–32)
COLOR UR AUTO: YELLOW
CREAT SERPL-MCNC: 0.78 MG/DL (ref 0.52–1.04)
DEPRECATED CALCIDIOL+CALCIFEROL SERPL-MC: 94 UG/L (ref 20–75)
FASTING STATUS PATIENT QL REPORTED: YES
GFR SERPL CREATININE-BSD FRML MDRD: >90 ML/MIN/1.73M2
GLUCOSE BLD-MCNC: 97 MG/DL (ref 70–99)
GLUCOSE UR STRIP-MCNC: NEGATIVE MG/DL
HDLC SERPL-MCNC: 87 MG/DL
HGB UR QL STRIP: NEGATIVE
KETONES UR STRIP-MCNC: NEGATIVE MG/DL
LDLC SERPL CALC-MCNC: 97 MG/DL
LEUKOCYTE ESTERASE UR QL STRIP: NEGATIVE
NITRATE UR QL: NEGATIVE
NONHDLC SERPL-MCNC: 112 MG/DL
PH UR STRIP: 6.5 [PH] (ref 5–7)
POTASSIUM BLD-SCNC: 3.9 MMOL/L (ref 3.4–5.3)
RBC #/AREA URNS AUTO: ABNORMAL /HPF
SODIUM SERPL-SCNC: 142 MMOL/L (ref 133–144)
SP GR UR STRIP: 1.02 (ref 1–1.03)
SQUAMOUS #/AREA URNS AUTO: ABNORMAL /LPF
TRIGL SERPL-MCNC: 76 MG/DL
UROBILINOGEN UR STRIP-ACNC: 0.2 E.U./DL
WBC #/AREA URNS AUTO: ABNORMAL /HPF

## 2022-06-13 PROCEDURE — 82550 ASSAY OF CK (CPK): CPT | Performed by: FAMILY MEDICINE

## 2022-06-13 PROCEDURE — 80061 LIPID PANEL: CPT | Performed by: FAMILY MEDICINE

## 2022-06-13 PROCEDURE — 36415 COLL VENOUS BLD VENIPUNCTURE: CPT | Performed by: FAMILY MEDICINE

## 2022-06-13 PROCEDURE — 81001 URINALYSIS AUTO W/SCOPE: CPT | Performed by: FAMILY MEDICINE

## 2022-06-13 PROCEDURE — 99396 PREV VISIT EST AGE 40-64: CPT | Performed by: FAMILY MEDICINE

## 2022-06-13 PROCEDURE — 80048 BASIC METABOLIC PNL TOTAL CA: CPT | Performed by: FAMILY MEDICINE

## 2022-06-13 PROCEDURE — 99213 OFFICE O/P EST LOW 20 MIN: CPT | Mod: 25 | Performed by: FAMILY MEDICINE

## 2022-06-13 PROCEDURE — 82306 VITAMIN D 25 HYDROXY: CPT | Performed by: FAMILY MEDICINE

## 2022-06-13 RX ORDER — DULOXETIN HYDROCHLORIDE 60 MG/1
60 CAPSULE, DELAYED RELEASE ORAL 2 TIMES DAILY
COMMUNITY
Start: 2022-06-13

## 2022-06-13 ASSESSMENT — ENCOUNTER SYMPTOMS
NERVOUS/ANXIOUS: 0
SORE THROAT: 0
DIARRHEA: 0
EYE PAIN: 0
CHILLS: 0
BREAST MASS: 0
COUGH: 0
PALPITATIONS: 0
HEADACHES: 0
HEMATURIA: 0
JOINT SWELLING: 0
FREQUENCY: 0
MYALGIAS: 1
ARTHRALGIAS: 0
HEMATOCHEZIA: 0
ABDOMINAL PAIN: 0
SHORTNESS OF BREATH: 0
CONSTIPATION: 0
WEAKNESS: 0
FEVER: 0
DYSURIA: 0
NAUSEA: 0
PARESTHESIAS: 1
DIZZINESS: 0
HEARTBURN: 1

## 2022-06-28 ENCOUNTER — TELEPHONE (OUTPATIENT)
Dept: FAMILY MEDICINE | Facility: CLINIC | Age: 47
End: 2022-06-28

## 2022-06-28 ENCOUNTER — TRANSFERRED RECORDS (OUTPATIENT)
Dept: HEALTH INFORMATION MANAGEMENT | Facility: CLINIC | Age: 47
End: 2022-06-28

## 2022-06-28 NOTE — TELEPHONE ENCOUNTER
Reason for Call:  Form, our goal is to have forms completed with 72 hours, however, some forms may require a visit or additional information.    Type of letter, form or note:    PT plan of care    Who is the form from?:   [facility]    Where did the form come from: form was faxed in    What clinic location was the form placed at?:   [facility]    Where the form was placed:   Dr. Garcia's box    What number is listed as a contact on the form?:   Fax: [phone]       Additional comments: none    Call taken on 6/28/2022 at 4:09 PM by Lexie Albarado

## 2022-06-30 NOTE — TELEPHONE ENCOUNTER
Plan of care signed, faxed, and sent for scanning  Jimena [facility] Unit Coordinator

## 2022-08-12 ENCOUNTER — THERAPY VISIT (OUTPATIENT)
Dept: PHYSICAL THERAPY | Facility: CLINIC | Age: 47
End: 2022-08-12
Attending: FAMILY MEDICINE
Payer: COMMERCIAL

## 2022-08-12 DIAGNOSIS — R39.15 URINARY URGENCY: ICD-10-CM

## 2022-08-12 DIAGNOSIS — M99.05 SOMATIC DYSFUNCTION OF PELVIS REGION: ICD-10-CM

## 2022-08-12 DIAGNOSIS — N39.46 MIXED INCONTINENCE: ICD-10-CM

## 2022-08-12 PROCEDURE — 97161 PT EVAL LOW COMPLEX 20 MIN: CPT | Mod: GP | Performed by: PHYSICAL THERAPIST

## 2022-08-12 PROCEDURE — 97530 THERAPEUTIC ACTIVITIES: CPT | Mod: GP | Performed by: PHYSICAL THERAPIST

## 2022-08-12 PROCEDURE — 97110 THERAPEUTIC EXERCISES: CPT | Mod: GP | Performed by: PHYSICAL THERAPIST

## 2022-08-12 NOTE — PROGRESS NOTES
SUBJECTIVE:  Patient reports onset of symptoms issues with urinary incontinence   Saw MD for this on 6/13/2022. .Symptoms include leaking with urge stress incontinence.  Since onset symptoms have been getting better, worse or staying the same? same    Urination:  Do you leak on the way to the bathroom or with a strong urge to void? Yes    Do you leak with cough,sneeze, jumping, running?Yes   Any other activities that cause leaking? No   Do you have triggers that make you feel you can't wait to go to the bathroom? yes   what are they arriving home.  Type of pad and number used per day? 0  When you leak what is the amount? Small to medium    How long can you delay the need to urinate? 15 minutes.   How many times do you get up to urinate at night? 0-1    Can you stop the flow of urine when on the toilet? No  Is the volume of urine passed usually: medium. (8 sec rule=  250 ml with average bladder storing  400-600 ml)    Do you strain to pass urine? No  Do you have a slow or hesitant urinary stream? No  Do you have difficulty initiating the urine stream? No  Is urination painful?  No    How many bladder infections have you had in last 12 months? 2    Fluid intake(one glass is 8 oz or one cup) 1 gallon of water, 2 caffinated glasses/day  na alcohol glasses/day.    Bowel habits:  Frequency of bowel movements? 1 times a week  Consistency of stool? soft formed,   Do you ignore the urge to defecate? Yes  Do you strain to pass stool? Yes    Pelvic Pain:  Do you have any pelvic pain with intercourse, exams, use of tampons? No  Is initial penetration during intercourse painful? No  Is deeper penetration painful? No  Do you use lubricant?   Yes What kind? NA      Given birth? Yes Any complications? No  # of vaginal deliveries?3   # of C-sections?0  # of episiotomies?yes.  Are you sexually active?No  Have you ever been worried for your physical safety? No  Any abdominal or pelvic surgeries? Umbilical hernia repair, hysterectomy  in 2014 due to heavy bleeding.   Are you having any regular exercise?yes, walks and getting back into yoga and strength training.  Has weight at home.   Have you practiced the PF(Kegel) exercises for 4 or more weeks?yes  Thyroid checked?(related to hair loss, flu-like symptoms, wt gain/loss, fatigue, menopause)  Changed diet lately?no    OBSERVATION  Lumbar Posture: Negative  Pelvic symmetry: Negative  Introitus: unremarkable  Trendelenberg Sign:Negative    ROM  Single leg standing unilateral hip flexion PSIS:Not Tested: Not indicated   Standing forward flexion PSIS:Not Tested: Not indicated   Passive Hip ROM:Negative  GERALDINE:Negative  GERALDINE with OP:Negative  Posterior Sacral Shear:Negative    MUSCLE PERFORMANCE  Active SLR:Negative  Abdominal Core 90/90 hip lower:Negative  Baseline PF tone:normal  PF Tone with cough: hyper  Valsalva: hyper  PF Response quality: brisk - normal  PF Power: Center: 4 Stronger on right/left even.  Endurance: Maximum contraction in seconds: 10  # of endurance contractions before fatigue: NA  Quick contraction repetitions prior to fatigue: NA.  Specificity/accessory muscles: WNL/WFL, Synergy with abdominals: WNL/WFL.  Prolapse: Cyctocele/Rectocele/Uterine stgstrstastdstest:st st1st Urethrocele: none, Enterocele: none.    PALPATION: Non-tender all superficial structures with digital evaluation     Physical Therapy Initial Evaluation  Subjective:  The history is provided by the patient. No  was used.   Patient Health History  Daniella Harris being seen for Pelvic Floor therapy.          Pain is reported as 2/10 on pain scale.  General health as reported by patient is good.  Pertinent medical history includes: changes in bowel/bladder.     Medical allergies: none.   Surgeries include:  Orthopedic surgery and other. Other surgery history details: Hernia, hysterectomy.    Current medications:  Anti-depressants and sleep medication.       Primary job tasks include:  Computer work and  prolonged sitting.                  Patient Health History  Daniella Harris being seen for Pelvic Floor therapy.          Pain is reported as 2/10 on pain scale.  General health as reported by patient is good.  Pertinent medical history includes: changes in bowel/bladder.     Medical allergies: none.   Surgeries include:  Orthopedic surgery and other. Other surgery history details: Hernia, hysterectomy.    Current medications:  Anti-depressants and sleep medication.       Primary job tasks include:  Computer work and prolonged sitting.                  Therapist Generated HPI Evaluation  Problem details: Saw MD for issues with urinary dysfunction on 6/13/2022.  Works at [employer] job..         Type of problem:  Incontinence.                                                 Objective:  System                                 Pelvic Dysfunction Evaluation:    Bladder/Pelvic Problems:    Storage Problem:  Mixed incontinence        Diagnostic Tests:  none                        Flexibility:  normal      Abdominal Wall:  normal      Scar Mobility:  Good  Pelvic Clock Exam:  normal            SI Provocation:  NA        Reflex Testing:  NA    External Assessment:    Skin Condition:  Normal    Bearing Down/Coughing:  Normal      Muscle Contraction/Perineal Mobility:  Elevation and urogential triangle descent  Internal Assessment:    Sensory Exam:  Normal  Contraction/Grade:  Good squeeze, good hold with lift, repeatable (4)    Accessory Muscle use-Gluteals:  X      SEMG Biofeedback:    Equipment:  Mr20 with computer    Suraface electrode placement--Perianal:  X  Baseline EMG PM:  3.0uV        EMG interpretation to fatigue:  8-10 seconds  Position:  SupineAdditional History:  Delivery History:  Vaginal delivery    Number of Live Births: 3                       General     ROS    Assessment/Plan:    Patient is a 47 year old female with pelvic complaints.    Patient has the following significant findings with corresponding  treatment plan.                Diagnosis 1:  Mixed incontinence  Decreased strength - therapeutic exercise, therapeutic activities and home program  Impaired muscle performance - biofeedback, neuro re-education and home program  Decreased function - therapeutic activities and home program    Therapy Evaluation Codes:   1) History comprised of:   Personal factors that impact the plan of care:      Time since onset of symptoms.    Comorbidity factors that impact the plan of care are:      None.     Medications impacting care: None.  2) Examination of Body Systems comprised of:   Body structures and functions that impact the plan of care:      Pelvis.   Activity limitations that impact the plan of care are:      Jumping, Stress incontinence and Urge incontinence.  3) Clinical presentation characteristics are:   Stable/Uncomplicated.  4) Decision-Making    Low complexity using standardized patient assessment instrument and/or measureable assessment of functional outcome.  Cumulative Therapy Evaluation is: Low complexity.    Previous and current functional limitations:  (See Goal Flow Sheet for this information)    Short term and Long term goals: (See Goal Flow Sheet for this information)     Communication ability:  Patient appears to be able to clearly communicate and understand verbal and written communication and follow directions correctly.  Treatment Explanation - The following has been discussed with the patient:   RX ordered/plan of care  Anticipated outcomes  Possible risks and side effects  This patient would benefit from PT intervention to resume normal activities.   Rehab potential is excellent.    Frequency:  One visit  Duration:  One visit  Discharge Plan:  Independent in home treatment program.  Reach maximal therapeutic benefit.    Please refer to the daily flowsheet for treatment today, total treatment time and time spent performing 1:1 timed codes.

## 2022-08-12 NOTE — PROGRESS NOTES
[facility]    OUTPATIENT Physical Therapy ORTHOPEDIC EVALUATION  PLAN OF TREATMENT FOR OUTPATIENT REHABILITATION  (COMPLETE FOR INITIAL CLAIMS ONLY)  Patient's Last Name, First Name, M.I.  YOB: 1975  Daniella Harris    Provider s Name:  [facility]   Medical Record No.  [id number]   Start of Care Date:  08/12/22   Onset Date:   06/13/22 (date patient saw MD for this issue)   Type:     _X__PT   ___OT Medical Diagnosis:    Encounter Diagnoses   Name Primary?    Urinary urgency     Mixed incontinence     Somatic dysfunction of pelvis region         Treatment Diagnosis:  urinary urgency        Goals:     08/12/22 0700   Urinary Leakage   Previous Functional Level No problems   Current Functional Level Leaking with urge;Leakage with cough or sneeze;Leaking with   Performance Level jumping   STG Target Performance Decrease leakage with urge   Performance Level no leaking   Rationale continence throughout the day   Due Date 09/12/22   LTG Target Performance Decrease leakage with   Performance Level no leaking with jumping or coughing   Rationale continence throughout the day;continence during work   Due Date 10/12/22       Therapy Frequency:  one visit  Predicted Duration of Therapy Intervention:  one visit    Lyly Osborn, PT                 I CERTIFY THE NEED FOR THESE SERVICES FURNISHED UNDER        THIS PLAN OF TREATMENT AND WHILE UNDER MY CARE     (Physician attestation of this document indicates review and certification of the therapy plan).                     Certification Date From:  08/12/22   Certification Date To:  11/09/22    Referring Provider:  Mady Garcia    Initial Assessment        See Epic Evaluation SOC Date: 08/12/22

## 2022-10-22 ENCOUNTER — HEALTH MAINTENANCE LETTER (OUTPATIENT)
Age: 47
End: 2022-10-22

## 2023-02-02 ENCOUNTER — TRANSFERRED RECORDS (OUTPATIENT)
Dept: HEALTH INFORMATION MANAGEMENT | Facility: CLINIC | Age: 48
End: 2023-02-02
Payer: COMMERCIAL

## 2023-02-02 NOTE — LETTER
February 20, 2023      Daniella Harris  [address] [id number]          Dear ,          We are writing to inform you of your test results.      The colonoscopy did show some polyps, which were not concerning, but would like you to     repeat this in 5 years to make sure that no new polyps grow.             If you have any questions or concerns, please call the clinic at the number listed above.                 Sincerely,    Mady Garcia MD/kira

## 2023-02-20 NOTE — RESULT ENCOUNTER NOTE
Hello,    The colonoscopy did show some polyps which were not concerning but would like you to repeat this in 5 years to make sure that no new polyps grow.    Mady Garcia MD

## 2023-04-01 ENCOUNTER — HEALTH MAINTENANCE LETTER (OUTPATIENT)
Age: 48
End: 2023-04-01

## 2024-03-23 ENCOUNTER — HEALTH MAINTENANCE LETTER (OUTPATIENT)
Age: 49
End: 2024-03-23

## 2024-09-29 ASSESSMENT — PATIENT HEALTH QUESTIONNAIRE - PHQ9
SUM OF ALL RESPONSES TO PHQ QUESTIONS 1-9: 6
10. IF YOU CHECKED OFF ANY PROBLEMS, HOW DIFFICULT HAVE THESE PROBLEMS MADE IT FOR YOU TO DO YOUR WORK, TAKE CARE OF THINGS AT HOME, OR GET ALONG WITH OTHER PEOPLE: SOMEWHAT DIFFICULT
SUM OF ALL RESPONSES TO PHQ QUESTIONS 1-9: 6

## 2024-09-30 ENCOUNTER — VIRTUAL VISIT (OUTPATIENT)
Dept: FAMILY MEDICINE | Facility: CLINIC | Age: 49
End: 2024-09-30
Payer: COMMERCIAL

## 2024-09-30 DIAGNOSIS — R53.83 FATIGUE, UNSPECIFIED TYPE: ICD-10-CM

## 2024-09-30 DIAGNOSIS — Z78.0 MENOPAUSE: Primary | ICD-10-CM

## 2024-09-30 PROCEDURE — 99213 OFFICE O/P EST LOW 20 MIN: CPT | Mod: 95 | Performed by: FAMILY MEDICINE

## 2024-09-30 PROCEDURE — G2211 COMPLEX E/M VISIT ADD ON: HCPCS | Mod: 95 | Performed by: FAMILY MEDICINE

## 2024-09-30 ASSESSMENT — ANXIETY QUESTIONNAIRES
3. WORRYING TOO MUCH ABOUT DIFFERENT THINGS: NOT AT ALL
7. FEELING AFRAID AS IF SOMETHING AWFUL MIGHT HAPPEN: NOT AT ALL
GAD7 TOTAL SCORE: 0
6. BECOMING EASILY ANNOYED OR IRRITABLE: NOT AT ALL
4. TROUBLE RELAXING: NOT AT ALL
5. BEING SO RESTLESS THAT IT IS HARD TO SIT STILL: NOT AT ALL
1. FEELING NERVOUS, ANXIOUS, OR ON EDGE: NOT AT ALL
8. IF YOU CHECKED OFF ANY PROBLEMS, HOW DIFFICULT HAVE THESE MADE IT FOR YOU TO DO YOUR WORK, TAKE CARE OF THINGS AT HOME, OR GET ALONG WITH OTHER PEOPLE?: NOT DIFFICULT AT ALL
7. FEELING AFRAID AS IF SOMETHING AWFUL MIGHT HAPPEN: NOT AT ALL
GAD7 TOTAL SCORE: 0
2. NOT BEING ABLE TO STOP OR CONTROL WORRYING: NOT AT ALL
IF YOU CHECKED OFF ANY PROBLEMS ON THIS QUESTIONNAIRE, HOW DIFFICULT HAVE THESE PROBLEMS MADE IT FOR YOU TO DO YOUR WORK, TAKE CARE OF THINGS AT HOME, OR GET ALONG WITH OTHER PEOPLE: NOT DIFFICULT AT ALL
GAD7 TOTAL SCORE: 0

## 2024-09-30 NOTE — PROGRESS NOTES
Daniella is a 49 year old who is being evaluated via a billable video visit.    How would you like to obtain your AVS? MyChart  If the video visit is dropped, the invitation should be resent by: Send to e-mail at: [email or web address]  Will anyone else be joining your video visit? No      Assessment & Plan     (Z78.0) Menopause  (primary encounter diagnosis)  Comment: Discussed multiple symptoms of menopause including concentration difficulties sleep irritability weight gain and generalized fatigue.  Will run labs as noted below to rule out other causes such as prediabetes diabetes thyroid disorder or deficiency.  Discussed risks and benefits to hormone replacement overall she would be a fine candidate she does not have a uterus and probably would only need an estrogen based patch.  If labs are nonrevealing can start this and see how she does.  Discussed ideally limited use of hormone replacement for 5 to 7 years and definitely stopping hormones at the age of 58.  Patient will also discuss this with her OBG team  Plan: TSH, T4, free, Iron and iron binding capacity,         Vitamin B12, Vitamin D Deficiency, CBC with         platelets, Comprehensive metabolic panel (BMP +        Alb, Alk Phos, ALT, AST, Total. Bili, TP),         Hemoglobin A1c            (R53.83) Fatigue, unspecified type  Comment:   Plan: TSH, T4, free, Iron and iron binding capacity,         Vitamin B12, Vitamin D Deficiency, CBC with         platelets, Comprehensive metabolic panel (BMP +        Alb, Alk Phos, ALT, AST, Total. Bili, TP),         Hemoglobin A1c                    See Patient Instructions    Subjective   Daniella is a 49 year old, presenting for the following health issues:  No chief complaint on file.      Via the Health Maintenance questionnaire, the patient has reported the following services have been completed -Mammogram: [facility] 2024-03-05, this information has been sent to the abstraction team.  History of Present  Illness       Reason for visit:  Thyroid check - weight gain, fatigue, coldness  Symptom onset:  More than a month  Symptoms include:  Weight gain, usually cold, fatigue,  Symptom intensity:  Moderate  Symptom progression:  Worsening  Had these symptoms before:  No  What makes it better:  Exercise   She is taking medications regularly.     Wonders about HRT  Spoke to friends and wonders about this  Has had borderline thyroid under activity  Has had some dry skin  So tired forgetful gaining weight  Has some hotflash has had a hysterectomy  Does not feel well rested with waking up  Mostly frustrated by mental recall feels this is significantly if impaired and is frustrating does have an OB/GYN that she recently had a physical with earlier in the year.  And has a message out to that provider to discuss this as well.                  Review of Systems  Constitutional, HEENT, cardiovascular, pulmonary, gi and gu systems are negative, except as otherwise noted.      Objective           Vitals:  No vitals were obtained today due to virtual visit.    Physical Exam   GENERAL: alert and no distress  EYES: Eyes grossly normal to inspection.  No discharge or erythema, or obvious scleral/conjunctival abnormalities.  RESP: No audible wheeze, cough, or visible cyanosis.    SKIN: Visible skin clear. No significant rash, abnormal pigmentation or lesions.  NEURO: Cranial nerves grossly intact.  Mentation and speech appropriate for age.  PSYCH: Appropriate affect, tone, and pace of words          Video-Visit Details  Joined the call at 9/30/2024, 5:25:30 pm.  Left the call at 9/30/2024, 5:36:40 pm.  You were on the call for 11 minutes 9 seconds .  Type of service:  Video Visit   Originating Location (pt. Location): Home    Distant Location (provider location):  On-site  Platform used for Video Visit: Rony  Signed Electronically by: Mady Gacria MD

## 2024-10-02 ENCOUNTER — LAB (OUTPATIENT)
Dept: LAB | Facility: CLINIC | Age: 49
End: 2024-10-02
Payer: COMMERCIAL

## 2024-10-02 DIAGNOSIS — R53.83 FATIGUE, UNSPECIFIED TYPE: ICD-10-CM

## 2024-10-02 DIAGNOSIS — Z78.0 MENOPAUSE: ICD-10-CM

## 2024-10-02 LAB
ERYTHROCYTE [DISTWIDTH] IN BLOOD BY AUTOMATED COUNT: 14 % (ref 10–15)
EST. AVERAGE GLUCOSE BLD GHB EST-MCNC: 114 MG/DL
HBA1C MFR BLD: 5.6 % (ref 0–5.6)
HCT VFR BLD AUTO: 39.9 % (ref 35–47)
HGB BLD-MCNC: 13.1 G/DL (ref 11.7–15.7)
MCH RBC QN AUTO: 27.8 PG (ref 26.5–33)
MCHC RBC AUTO-ENTMCNC: 32.8 G/DL (ref 31.5–36.5)
MCV RBC AUTO: 85 FL (ref 78–100)
PLATELET # BLD AUTO: 278 10E3/UL (ref 150–450)
RBC # BLD AUTO: 4.72 10E6/UL (ref 3.8–5.2)
WBC # BLD AUTO: 6.4 10E3/UL (ref 4–11)

## 2024-10-02 PROCEDURE — 80053 COMPREHEN METABOLIC PANEL: CPT

## 2024-10-02 PROCEDURE — 82607 VITAMIN B-12: CPT

## 2024-10-02 PROCEDURE — 84439 ASSAY OF FREE THYROXINE: CPT

## 2024-10-02 PROCEDURE — 36415 COLL VENOUS BLD VENIPUNCTURE: CPT

## 2024-10-02 PROCEDURE — 85027 COMPLETE CBC AUTOMATED: CPT

## 2024-10-02 PROCEDURE — 83036 HEMOGLOBIN GLYCOSYLATED A1C: CPT

## 2024-10-02 PROCEDURE — 83540 ASSAY OF IRON: CPT

## 2024-10-02 PROCEDURE — 84443 ASSAY THYROID STIM HORMONE: CPT

## 2024-10-02 PROCEDURE — 82306 VITAMIN D 25 HYDROXY: CPT

## 2024-10-02 PROCEDURE — 83550 IRON BINDING TEST: CPT

## 2024-10-03 LAB
ALBUMIN SERPL BCG-MCNC: 4.6 G/DL (ref 3.5–5.2)
ALP SERPL-CCNC: 54 U/L (ref 40–150)
ALT SERPL W P-5'-P-CCNC: 24 U/L (ref 0–50)
ANION GAP SERPL CALCULATED.3IONS-SCNC: 10 MMOL/L (ref 7–15)
AST SERPL W P-5'-P-CCNC: 21 U/L (ref 0–45)
BILIRUB SERPL-MCNC: 0.3 MG/DL
BUN SERPL-MCNC: 19.5 MG/DL (ref 6–20)
CALCIUM SERPL-MCNC: 9.7 MG/DL (ref 8.8–10.4)
CHLORIDE SERPL-SCNC: 102 MMOL/L (ref 98–107)
CREAT SERPL-MCNC: 0.86 MG/DL (ref 0.51–0.95)
EGFRCR SERPLBLD CKD-EPI 2021: 81 ML/MIN/1.73M2
GLUCOSE SERPL-MCNC: 112 MG/DL (ref 70–99)
HCO3 SERPL-SCNC: 28 MMOL/L (ref 22–29)
IRON BINDING CAPACITY (ROCHE): 334 UG/DL (ref 240–430)
IRON SATN MFR SERPL: 13 % (ref 15–46)
IRON SERPL-MCNC: 45 UG/DL (ref 37–145)
POTASSIUM SERPL-SCNC: 4.4 MMOL/L (ref 3.4–5.3)
PROT SERPL-MCNC: 7 G/DL (ref 6.4–8.3)
SODIUM SERPL-SCNC: 140 MMOL/L (ref 135–145)
T4 FREE SERPL-MCNC: 1.05 NG/DL (ref 0.9–1.7)
TSH SERPL DL<=0.005 MIU/L-ACNC: 3.57 UIU/ML (ref 0.3–4.2)
VIT B12 SERPL-MCNC: 518 PG/ML (ref 232–1245)
VIT D+METAB SERPL-MCNC: 86 NG/ML (ref 20–50)

## 2024-10-14 NOTE — RESULT ENCOUNTER NOTE
Hello,    Your results show your iron levels are a little low.  For this I would recommend trying extra iron in your diet or possibly an iron supplement for 2 months taking this once daily with orange juice or something acidic.  And rechecking levels after 2 months to see if your body absorbs it.    Vitamin D was on the high end.  You can stop taking extra vitamin D for a few months and then resume at half the dose of what you have been taking and that should be just fine.    Comprehensive panel is excellent blood sugar was a little high but nothing considerable.  Thyroid level was in range as was B12.    The hemoglobin A1c was 5.6 this is just below the limit for prediabetes and I would recommend boosting aerobic exercise and continuing to keep weight healthy.  Avoiding high calorie and carbohydrate based foods will also help.    Complete blood count was normal.    Mady Garcia MD

## 2025-01-22 ENCOUNTER — TRANSFERRED RECORDS (OUTPATIENT)
Dept: HEALTH INFORMATION MANAGEMENT | Facility: CLINIC | Age: 50
End: 2025-01-22
Payer: COMMERCIAL

## 2025-01-22 LAB
CREATININE (EXTERNAL): 1.12 MG/DL (ref 0.55–1.02)
GFR ESTIMATED (EXTERNAL): 60 ML/MIN/1.73M2
GLUCOSE (EXTERNAL): 95 MG/DL (ref 70–100)
POTASSIUM (EXTERNAL): 4.1 MMOL/L (ref 3.5–5.1)

## 2025-02-12 ENCOUNTER — OFFICE VISIT (OUTPATIENT)
Dept: FAMILY MEDICINE | Facility: CLINIC | Age: 50
End: 2025-02-12
Payer: COMMERCIAL

## 2025-02-12 VITALS
BODY MASS INDEX: 26.04 KG/M2 | TEMPERATURE: 97.9 F | HEART RATE: 62 BPM | WEIGHT: 192 LBS | RESPIRATION RATE: 16 BRPM | SYSTOLIC BLOOD PRESSURE: 136 MMHG | OXYGEN SATURATION: 100 % | DIASTOLIC BLOOD PRESSURE: 81 MMHG

## 2025-02-12 DIAGNOSIS — R07.89 OTHER CHEST PAIN: ICD-10-CM

## 2025-02-12 DIAGNOSIS — R89.9 ABNORMAL LABORATORY TEST RESULT: Primary | ICD-10-CM

## 2025-02-12 LAB
EST. AVERAGE GLUCOSE BLD GHB EST-MCNC: 111 MG/DL
HBA1C MFR BLD: 5.5 % (ref 0–5.6)

## 2025-02-12 PROCEDURE — 80048 BASIC METABOLIC PNL TOTAL CA: CPT | Performed by: FAMILY MEDICINE

## 2025-02-12 PROCEDURE — 3079F DIAST BP 80-89 MM HG: CPT | Performed by: FAMILY MEDICINE

## 2025-02-12 PROCEDURE — 3075F SYST BP GE 130 - 139MM HG: CPT | Performed by: FAMILY MEDICINE

## 2025-02-12 PROCEDURE — 36415 COLL VENOUS BLD VENIPUNCTURE: CPT | Performed by: FAMILY MEDICINE

## 2025-02-12 PROCEDURE — 83036 HEMOGLOBIN GLYCOSYLATED A1C: CPT | Performed by: FAMILY MEDICINE

## 2025-02-12 PROCEDURE — 82550 ASSAY OF CK (CPK): CPT | Performed by: FAMILY MEDICINE

## 2025-02-12 PROCEDURE — 99213 OFFICE O/P EST LOW 20 MIN: CPT | Performed by: FAMILY MEDICINE

## 2025-02-12 RX ORDER — VALACYCLOVIR HYDROCHLORIDE 500 MG/1
500 TABLET, FILM COATED ORAL
COMMUNITY
Start: 2023-02-09

## 2025-02-12 RX ORDER — DEXTROAMPHETAMINE SACCHARATE, AMPHETAMINE ASPARTATE, DEXTROAMPHETAMINE SULFATE AND AMPHETAMINE SULFATE 2.5; 2.5; 2.5; 2.5 MG/1; MG/1; MG/1; MG/1
TABLET ORAL
COMMUNITY
Start: 2022-01-04

## 2025-02-12 RX ORDER — LISDEXAMFETAMINE DIMESYLATE 50 MG
50 CAPSULE ORAL
COMMUNITY
Start: 2022-01-03

## 2025-02-12 RX ORDER — ESTRADIOL 0.03 MG/D
PATCH TRANSDERMAL
COMMUNITY
Start: 2024-11-01 | End: 2025-02-12

## 2025-02-12 RX ORDER — ESTRADIOL 0.05 MG/D
1 PATCH, EXTENDED RELEASE TRANSDERMAL
COMMUNITY
Start: 2024-10-21

## 2025-02-12 ASSESSMENT — PATIENT HEALTH QUESTIONNAIRE - PHQ9
SUM OF ALL RESPONSES TO PHQ QUESTIONS 1-9: 4
SUM OF ALL RESPONSES TO PHQ QUESTIONS 1-9: 4
10. IF YOU CHECKED OFF ANY PROBLEMS, HOW DIFFICULT HAVE THESE PROBLEMS MADE IT FOR YOU TO DO YOUR WORK, TAKE CARE OF THINGS AT HOME, OR GET ALONG WITH OTHER PEOPLE: SOMEWHAT DIFFICULT

## 2025-02-12 NOTE — PROGRESS NOTES
{PROVIDER CHARTING PREFERENCE:991331}    Subjective   Daniella is a 49 year old, presenting for the following health issues:  No chief complaint on file.        2/12/2025     1:56 PM   Additional Questions   Roomed by Sofia PATTERSON MA   Accompanied by self         2/12/2025     1:56 PM   Patient Reported Additional Medications   Patient reports taking the following new medications none     HPI       ED/UC Followup:    Facility:  [facility]   Date of visit: 1/22/2025  Reason for visit: chest pain   Current Status: improved     Was in the ER   1/22/25  At that time she had right sided shoulder arm intense and hard to breath then it resolved  Felt it was a pulled muscle in shoulder down arm and chest  No nausea no excessive sweating  Hard to breath due to pain felt like pressure    Was staging her house and moving a lot of things prior feels MSK        {ROS Picklists (Optional):000498}      Objective    LMP 05/15/2012   There is no height or weight on file to calculate BMI.  Physical Exam   {Exam List (Optional):040257}    {Diagnostic Test Results (Optional):235250}        Signed Electronically by: Mady Garcia MD  {Email feedback regarding this note to [email or web address]   :[id number]}  Answers submitted by the patient for this visit:  Patient Health Questionnaire (Submitted on 2/12/2025)  If you checked off any problems, how difficult have these problems made it for you to do your work, take care of things at home, or get along with other people?: Somewhat difficult  PHQ9 TOTAL SCORE: 4     01/22/2025   Component Date Value Ref Range Status    Glucose (External) 01/22/2025 95  70 - 100 mg/dL Final    Creatinine (External) 01/22/2025 1.12 (A)  0.55 - 1.02 mg/dL Final    GFR Estimated (External) 01/22/2025 60 (A)  >60 mL/min/1.73m2 Final    Potassium (External) 01/22/2025 4.1  3.5 - 5.1 mmol/L Final           Signed Electronically by: Mady Garcia MD    Answers submitted by the patient for this visit:  Patient Health Questionnaire (Submitted on 2/12/2025)  If you checked off any problems, how difficult have these problems made it for you to do your work, take care of things at home, or get along with other people?: Somewhat difficult  PHQ9 TOTAL SCORE: 4

## 2025-02-13 LAB
ANION GAP SERPL CALCULATED.3IONS-SCNC: 11 MMOL/L (ref 7–15)
BUN SERPL-MCNC: 25.2 MG/DL (ref 6–20)
CALCIUM SERPL-MCNC: 10.1 MG/DL (ref 8.8–10.4)
CHLORIDE SERPL-SCNC: 103 MMOL/L (ref 98–107)
CK SERPL-CCNC: 76 U/L (ref 26–192)
CREAT SERPL-MCNC: 0.92 MG/DL (ref 0.51–0.95)
EGFRCR SERPLBLD CKD-EPI 2021: 75 ML/MIN/1.73M2
GLUCOSE SERPL-MCNC: 87 MG/DL (ref 70–99)
HCO3 SERPL-SCNC: 25 MMOL/L (ref 22–29)
POTASSIUM SERPL-SCNC: 4.3 MMOL/L (ref 3.4–5.3)
SODIUM SERPL-SCNC: 139 MMOL/L (ref 135–145)

## 2025-03-02 NOTE — RESULT ENCOUNTER NOTE
Hello,    The labs show normal electrolytes the urea nitrogen was a bit high sometimes this can happen if the blood is too concentrated try boosting water and fluids for this.    The lab for muscle injury was normal and the A1c showed that your prediabetes levels are a little lower than 5 months ago this is wonderful keep up the good work of exercise and low carbohydrate diet.  We can repeat this in another 6 months    Mady Garcia MD

## 2025-06-18 ENCOUNTER — PATIENT OUTREACH (OUTPATIENT)
Dept: CARE COORDINATION | Facility: CLINIC | Age: 50
End: 2025-06-18
Payer: COMMERCIAL

## (undated) DEVICE — SU VICRYL 3-0 SH 27" J316H

## (undated) DEVICE — ESU PENCIL W/SMOKE EVAC NEPTUNE STRYKER 0703-046-000

## (undated) DEVICE — DRAPE LAP W/ARMBOARD 29410

## (undated) DEVICE — GLOVE PROTEXIS W/NEU-THERA 8.0  2D73TE80

## (undated) DEVICE — ESU ELEC BLADE 2.75" COATED/INSULATED E1455

## (undated) DEVICE — SU VICRYL 4-0 PS-2 18" UND J496H

## (undated) DEVICE — NDL 19GA 1.5"

## (undated) DEVICE — DRSG STERI STRIP 1/2X4" R1547

## (undated) DEVICE — PACK MINOR SBA15MIFSE

## (undated) DEVICE — GOWN IMPERVIOUS SPECIALTY XLG/XLONG 32474

## (undated) DEVICE — SU VICRYL 0 UR-6 27" J603H

## (undated) DEVICE — SYR 10ML LL W/O NDL 302995

## (undated) DEVICE — SOL WATER IRRIG 1000ML BOTTLE 2F7114

## (undated) DEVICE — PREP CHLORAPREP 26ML TINTED ORANGE  260815

## (undated) DEVICE — DECANTER VIAL 2006S

## (undated) DEVICE — LINEN TOWEL PACK X5 5464

## (undated) DEVICE — NDL 25GA 1.5" 305127

## (undated) RX ORDER — HYDROCODONE BITARTRATE AND ACETAMINOPHEN 5; 325 MG/1; MG/1
TABLET ORAL
Status: DISPENSED
Start: 2019-06-17

## (undated) RX ORDER — PROPOFOL 10 MG/ML
INJECTION, EMULSION INTRAVENOUS
Status: DISPENSED
Start: 2019-06-17

## (undated) RX ORDER — CEFAZOLIN SODIUM 2 G/100ML
INJECTION, SOLUTION INTRAVENOUS
Status: DISPENSED
Start: 2019-06-17

## (undated) RX ORDER — FENTANYL CITRATE 50 UG/ML
INJECTION, SOLUTION INTRAMUSCULAR; INTRAVENOUS
Status: DISPENSED
Start: 2019-06-17

## (undated) RX ORDER — KETOROLAC TROMETHAMINE 30 MG/ML
INJECTION, SOLUTION INTRAMUSCULAR; INTRAVENOUS
Status: DISPENSED
Start: 2019-06-17